# Patient Record
Sex: MALE | Race: WHITE | NOT HISPANIC OR LATINO | URBAN - METROPOLITAN AREA
[De-identification: names, ages, dates, MRNs, and addresses within clinical notes are randomized per-mention and may not be internally consistent; named-entity substitution may affect disease eponyms.]

---

## 2017-05-01 ENCOUNTER — OUTPATIENT (OUTPATIENT)
Dept: OUTPATIENT SERVICES | Facility: HOSPITAL | Age: 67
LOS: 1 days | End: 2017-05-01
Payer: MEDICAID

## 2017-05-03 DIAGNOSIS — R69 ILLNESS, UNSPECIFIED: ICD-10-CM

## 2017-06-01 PROCEDURE — G9001: CPT

## 2022-03-09 ENCOUNTER — INPATIENT (INPATIENT)
Facility: HOSPITAL | Age: 72
LOS: 6 days | Discharge: NURSING, REHAB OR LONG-TERM CARE | DRG: 300 | End: 2022-03-16
Attending: STUDENT IN AN ORGANIZED HEALTH CARE EDUCATION/TRAINING PROGRAM | Admitting: STUDENT IN AN ORGANIZED HEALTH CARE EDUCATION/TRAINING PROGRAM
Payer: MEDICARE

## 2022-03-09 VITALS
DIASTOLIC BLOOD PRESSURE: 87 MMHG | HEART RATE: 101 BPM | SYSTOLIC BLOOD PRESSURE: 144 MMHG | TEMPERATURE: 98 F | OXYGEN SATURATION: 95 % | RESPIRATION RATE: 19 BRPM

## 2022-03-09 DIAGNOSIS — I72.9 ANEURYSM OF UNSPECIFIED SITE: ICD-10-CM

## 2022-03-10 DIAGNOSIS — Z98.61 CORONARY ANGIOPLASTY STATUS: Chronic | ICD-10-CM

## 2022-03-10 DIAGNOSIS — Z98.890 OTHER SPECIFIED POSTPROCEDURAL STATES: Chronic | ICD-10-CM

## 2022-03-10 LAB
ALBUMIN SERPL ELPH-MCNC: 4 G/DL — SIGNIFICANT CHANGE UP (ref 3.3–5)
ALP SERPL-CCNC: 71 U/L — SIGNIFICANT CHANGE UP (ref 40–120)
ALT FLD-CCNC: 18 U/L — SIGNIFICANT CHANGE UP (ref 10–45)
ANION GAP SERPL CALC-SCNC: 14 MMOL/L — SIGNIFICANT CHANGE UP (ref 5–17)
APTT BLD: 29.4 SEC — SIGNIFICANT CHANGE UP (ref 27.5–35.5)
AST SERPL-CCNC: 15 U/L — SIGNIFICANT CHANGE UP (ref 10–40)
BILIRUB SERPL-MCNC: 0.3 MG/DL — SIGNIFICANT CHANGE UP (ref 0.2–1.2)
BLD GP AB SCN SERPL QL: NEGATIVE — SIGNIFICANT CHANGE UP
BUN SERPL-MCNC: 13 MG/DL — SIGNIFICANT CHANGE UP (ref 7–23)
CALCIUM SERPL-MCNC: 9.1 MG/DL — SIGNIFICANT CHANGE UP (ref 8.4–10.5)
CHLORIDE SERPL-SCNC: 98 MMOL/L — SIGNIFICANT CHANGE UP (ref 96–108)
CO2 SERPL-SCNC: 20 MMOL/L — LOW (ref 22–31)
CREAT SERPL-MCNC: 0.77 MG/DL — SIGNIFICANT CHANGE UP (ref 0.5–1.3)
EGFR: 96 ML/MIN/1.73M2 — SIGNIFICANT CHANGE UP
GLUCOSE SERPL-MCNC: 124 MG/DL — HIGH (ref 70–99)
HCT VFR BLD CALC: 39.1 % — SIGNIFICANT CHANGE UP (ref 39–50)
HCV AB S/CO SERPL IA: 0.09 S/CO — SIGNIFICANT CHANGE UP (ref 0–0.99)
HCV AB SERPL-IMP: SIGNIFICANT CHANGE UP
HGB BLD-MCNC: 13.3 G/DL — SIGNIFICANT CHANGE UP (ref 13–17)
INR BLD: 1.15 RATIO — SIGNIFICANT CHANGE UP (ref 0.88–1.16)
MAGNESIUM SERPL-MCNC: 1.9 MG/DL — SIGNIFICANT CHANGE UP (ref 1.6–2.6)
MCHC RBC-ENTMCNC: 31.1 PG — SIGNIFICANT CHANGE UP (ref 27–34)
MCHC RBC-ENTMCNC: 34 GM/DL — SIGNIFICANT CHANGE UP (ref 32–36)
MCV RBC AUTO: 91.4 FL — SIGNIFICANT CHANGE UP (ref 80–100)
NRBC # BLD: 0 /100 WBCS — SIGNIFICANT CHANGE UP (ref 0–0)
PHOSPHATE SERPL-MCNC: 3.8 MG/DL — SIGNIFICANT CHANGE UP (ref 2.5–4.5)
PLATELET # BLD AUTO: 163 K/UL — SIGNIFICANT CHANGE UP (ref 150–400)
POTASSIUM SERPL-MCNC: 3.6 MMOL/L — SIGNIFICANT CHANGE UP (ref 3.5–5.3)
POTASSIUM SERPL-SCNC: 3.6 MMOL/L — SIGNIFICANT CHANGE UP (ref 3.5–5.3)
PROT SERPL-MCNC: 6.6 G/DL — SIGNIFICANT CHANGE UP (ref 6–8.3)
PROTHROM AB SERPL-ACNC: 13.4 SEC — SIGNIFICANT CHANGE UP (ref 10.5–13.4)
RBC # BLD: 4.28 M/UL — SIGNIFICANT CHANGE UP (ref 4.2–5.8)
RBC # FLD: 13.9 % — SIGNIFICANT CHANGE UP (ref 10.3–14.5)
RH IG SCN BLD-IMP: NEGATIVE — SIGNIFICANT CHANGE UP
SARS-COV-2 RNA SPEC QL NAA+PROBE: SIGNIFICANT CHANGE UP
SODIUM SERPL-SCNC: 132 MMOL/L — LOW (ref 135–145)
WBC # BLD: 7.14 K/UL — SIGNIFICANT CHANGE UP (ref 3.8–10.5)
WBC # FLD AUTO: 7.14 K/UL — SIGNIFICANT CHANGE UP (ref 3.8–10.5)

## 2022-03-10 PROCEDURE — 99223 1ST HOSP IP/OBS HIGH 75: CPT

## 2022-03-10 PROCEDURE — 74174 CTA ABD&PLVS W/CONTRAST: CPT | Mod: 26

## 2022-03-10 PROCEDURE — 71275 CT ANGIOGRAPHY CHEST: CPT | Mod: 26

## 2022-03-10 RX ORDER — ASPIRIN/CALCIUM CARB/MAGNESIUM 324 MG
81 TABLET ORAL DAILY
Refills: 0 | Status: DISCONTINUED | OUTPATIENT
Start: 2022-03-10 | End: 2022-03-16

## 2022-03-10 RX ORDER — CLOPIDOGREL BISULFATE 75 MG/1
75 TABLET, FILM COATED ORAL DAILY
Refills: 0 | Status: DISCONTINUED | OUTPATIENT
Start: 2022-03-10 | End: 2022-03-16

## 2022-03-10 RX ORDER — AMLODIPINE BESYLATE 2.5 MG/1
10 TABLET ORAL DAILY
Refills: 0 | Status: DISCONTINUED | OUTPATIENT
Start: 2022-03-10 | End: 2022-03-16

## 2022-03-10 RX ORDER — CHLORPROMAZINE HCL 10 MG
1 TABLET ORAL
Qty: 0 | Refills: 0 | DISCHARGE

## 2022-03-10 RX ORDER — CHLORPROMAZINE HCL 10 MG
100 TABLET ORAL THREE TIMES A DAY
Refills: 0 | Status: DISCONTINUED | OUTPATIENT
Start: 2022-03-10 | End: 2022-03-16

## 2022-03-10 RX ORDER — AMLODIPINE BESYLATE 2.5 MG/1
1 TABLET ORAL
Qty: 0 | Refills: 0 | DISCHARGE

## 2022-03-10 RX ORDER — ASPIRIN/CALCIUM CARB/MAGNESIUM 324 MG
1 TABLET ORAL
Qty: 0 | Refills: 0 | DISCHARGE

## 2022-03-10 RX ORDER — SPIRONOLACTONE 25 MG/1
50 TABLET, FILM COATED ORAL DAILY
Refills: 0 | Status: DISCONTINUED | OUTPATIENT
Start: 2022-03-10 | End: 2022-03-16

## 2022-03-10 RX ORDER — ENOXAPARIN SODIUM 100 MG/ML
40 INJECTION SUBCUTANEOUS EVERY 24 HOURS
Refills: 0 | Status: DISCONTINUED | OUTPATIENT
Start: 2022-03-10 | End: 2022-03-16

## 2022-03-10 RX ORDER — POTASSIUM CHLORIDE 20 MEQ
10 PACKET (EA) ORAL
Refills: 0 | Status: COMPLETED | OUTPATIENT
Start: 2022-03-10 | End: 2022-03-10

## 2022-03-10 RX ORDER — SODIUM CHLORIDE 9 MG/ML
1000 INJECTION, SOLUTION INTRAVENOUS
Refills: 0 | Status: DISCONTINUED | OUTPATIENT
Start: 2022-03-10 | End: 2022-03-10

## 2022-03-10 RX ORDER — ATORVASTATIN CALCIUM 80 MG/1
1 TABLET, FILM COATED ORAL
Qty: 0 | Refills: 0 | DISCHARGE

## 2022-03-10 RX ORDER — CARVEDILOL PHOSPHATE 80 MG/1
25 CAPSULE, EXTENDED RELEASE ORAL EVERY 12 HOURS
Refills: 0 | Status: DISCONTINUED | OUTPATIENT
Start: 2022-03-10 | End: 2022-03-16

## 2022-03-10 RX ORDER — ATORVASTATIN CALCIUM 80 MG/1
40 TABLET, FILM COATED ORAL AT BEDTIME
Refills: 0 | Status: DISCONTINUED | OUTPATIENT
Start: 2022-03-10 | End: 2022-03-16

## 2022-03-10 RX ADMIN — Medication 81 MILLIGRAM(S): at 13:02

## 2022-03-10 RX ADMIN — Medication 100 MILLIGRAM(S): at 21:20

## 2022-03-10 RX ADMIN — ENOXAPARIN SODIUM 40 MILLIGRAM(S): 100 INJECTION SUBCUTANEOUS at 05:10

## 2022-03-10 RX ADMIN — AMLODIPINE BESYLATE 10 MILLIGRAM(S): 2.5 TABLET ORAL at 05:09

## 2022-03-10 RX ADMIN — Medication 100 MILLIGRAM(S): at 13:02

## 2022-03-10 RX ADMIN — ATORVASTATIN CALCIUM 40 MILLIGRAM(S): 80 TABLET, FILM COATED ORAL at 21:19

## 2022-03-10 RX ADMIN — Medication 100 MILLIEQUIVALENT(S): at 09:22

## 2022-03-10 RX ADMIN — CARVEDILOL PHOSPHATE 25 MILLIGRAM(S): 80 CAPSULE, EXTENDED RELEASE ORAL at 17:06

## 2022-03-10 RX ADMIN — Medication 100 MILLIEQUIVALENT(S): at 10:56

## 2022-03-10 RX ADMIN — SPIRONOLACTONE 50 MILLIGRAM(S): 25 TABLET, FILM COATED ORAL at 05:10

## 2022-03-10 RX ADMIN — CLOPIDOGREL BISULFATE 75 MILLIGRAM(S): 75 TABLET, FILM COATED ORAL at 13:02

## 2022-03-10 RX ADMIN — Medication 100 MILLIGRAM(S): at 05:09

## 2022-03-10 RX ADMIN — Medication 100 MILLIEQUIVALENT(S): at 13:02

## 2022-03-10 RX ADMIN — CARVEDILOL PHOSPHATE 25 MILLIGRAM(S): 80 CAPSULE, EXTENDED RELEASE ORAL at 05:10

## 2022-03-10 NOTE — H&P ADULT - ASSESSMENT
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     Plan:  - ASA and plavix   - on home meds  - NPO/IVF  - stat labs(CBC, CMP, PTT/PT/INR, type and screen)  - f/u vitals  - f/u I&O    Plan discussed with vascular fellow Dr. 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     Plan:  - ASA and plavix   - on home meds  - NPO/IVF  - stat labs(CBC, CMP, PTT/PT/INR, type and screen)  - f/u vitals  - f/u I&O    Plan discussed with vascular fellow Dr. 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     Plan:  - continue ASA and plavix given recent cardiac stent (RCA stent 2/2022)  - continue home meds  - NPO for now, IVF  - medical and cardiology optimization  - imaging CD from [facility] given to radiology dept to be uploaded  - plan dw fellow    Vascular  [id number]

## 2022-03-10 NOTE — PATIENT PROFILE ADULT - HAVE YOU EXPERIENCED VIOLENCE OR A TRAUMATIC EVENT?
Impression: Vitreous degeneration, right eye: H43.811. Plan: Discussed diagnosis in detail with patient. There is no evidence of retinal pathology. No treatment is required at this time. Will continue to observe condition and or symptoms. Discussed signs and symptoms of PVD/floaters. Discussed signs and symptoms of retinal detachment/tears. Patient instructed to call the office immediately if any symptoms noted. Patient instructed to call if condition gets worse. no

## 2022-03-10 NOTE — PATIENT PROFILE ADULT - FALL HARM RISK - HARM RISK INTERVENTIONS

## 2022-03-10 NOTE — PHYSICAL THERAPY INITIAL EVALUATION ADULT - PERTINENT HX OF CURRENT PROBLEM, REHAB EVAL
Pt is a 73yo male with PMH of CAD s/p stent 2/2022, HTN. Patient presented to ER at [facility] yesterday with SOB and elevated heart rate. CT was done at OSH  to r/o PE, which showed a 6.7cm suprarenal abdominal aorta aneurysm, no PE. Patient was then transferred to [facility] for further care.

## 2022-03-10 NOTE — H&P ADULT - ATTENDING COMMENTS
73 yo M who presents as transfer with asymptomatic Type IV thoracoabdominal aortic aneurysm  notified by [facility] ER that patient has 6cm suprarenal AAA with inability for their vascular surgeons to take care of patient  patient accepted as transfer for higher level of care  CTA reviewed - Type IV TAAA  pt active smoker with history of psychiatric disease on Thorazine  consult Dr. Candace Murrieta for cardiac evaluation and management  TAAA meets size criteria for repair  will plan for open TAAA repair pending risk stratification and optimization 71 yo M who presents as transfer with asymptomatic Type III thoracoabdominal aortic aneurysm  notified by [facility] ER that patient has 6cm suprarenal AAA with inability for their vascular surgeons to take care of patient  patient accepted as transfer for higher level of care  CTA reviewed - Type III TAAA  pt active smoker with history of psychiatric disease on Thorazine  consult Dr. Candace Murrieta for cardiac evaluation and management  TAAA meets size criteria for repair  will plan for open TAAA repair pending risk stratification and optimization

## 2022-03-10 NOTE — PHYSICAL THERAPY INITIAL EVALUATION ADULT - PLANNED THERAPY INTERVENTIONS, PT EVAL
GOAL: Pt will perform 12 stairs with or without U HR as needed within 2-4weeks./balance training/bed mobility training/gait training/transfer training

## 2022-03-10 NOTE — H&P ADULT - NSHPLABSRESULTS_GEN_ALL_CORE
LABS:                         13.3   7.14  )-----------( 163      ( 10 Mar 2022 00:24 )             39.1           PT/INR - ( 10 Mar 2022 00:24 )   PT: 13.4 sec;   INR: 1.15 ratio         PTT - ( 10 Mar 2022 00:24 )  PTT:29.4 sec          CAPILLARY BLOOD GLUCOSE      CT from OSH uploaded.

## 2022-03-10 NOTE — PHYSICAL THERAPY INITIAL EVALUATION ADULT - PRECAUTIONS/LIMITATIONS, REHAB EVAL
CT abdomen: Aneurysmal dilatation of the aorta superior to the aortic hiatus of the diaphragm up to 5.1 x 4.6 cm. Suprarenal aortic aneurysm with atheromatous plaque present with total luminal diameter of 6 cm  Severe atherosclerotic plaque. Aneurysmal dilatation of the right common iliac artery, right internal iliac artery, and left internal iliac artery as above. Occlusion of the distal right superficial femoral artery. Severe narrowing of the left superficial femoral artery.Ill-defined hypodense area in the medial spleen with mild perisplenic stranding suspicious for abscess. The differential also includes infarct and malignancy./cardiac precautions/fall precautions

## 2022-03-10 NOTE — H&P ADULT - HISTORY OF PRESENT ILLNESS
Mr. Graves is a 71yo male with PMH of CAD s/p stent 2/2022, HTN. Patient presented to ER at [facility] yesterday with SOB and elevated heart rate.  Chest CT was done at OSH  to r/o PE, which showed a 6.7cm suprarenal abdominal aorta aneurysm, no PE. Patient was then transferred to [facility] for further care overnight. Patient has no history of AAA and is not aware of this before. Patient reports he has epigastric abdominal pain for a few days, blunt and consistent.  Patient has normal appetite, normal BM and urination. Denies chest pain, headache,  n/v/d,  fever.  Mr. Graves is a 71yo male with PMH of CAD s/p stent 2/2022, HTN. Patient presented to ER at [facility] yesterday with SOB and elevated heart rate. CT was done at OSH  to r/o PE, which showed a 6.7cm suprarenal abdominal aorta aneurysm, no PE. Patient was then transferred to [facility] for further care overnight. Patient has no history of AAA and is not aware of this before. Patient reports he has epigastric abdominal pain for a few days, blunt and consistent.  Patient has normal appetite, normal BM and urination. Denies chest pain, headache,  n/v/d,  fever.  Mr. Graves is a 73yo male with PMH of CAD s/p stent 2/2022, HTN. Patient presented to ER at [facility] yesterday with SOB and elevated heart rate. CT was done at OSH  to r/o PE, which showed a 6.7cm suprarenal abdominal aorta aneurysm, no PE. Patient was then transferred to [facility] for further care overnight. Patient has no history of AAA and is not aware of this before.  Patient has normal appetite, normal BM and urination. Denies chest pain, headache,  n/v/d,  fever.

## 2022-03-10 NOTE — H&P ADULT - NSHPPHYSICALEXAM_GEN_ALL_CORE
General: NAD, Lying in bed comfortably  Neuro: A+Ox3  HEENT: NC/AT, EOMI  Neck: Soft, supple  Cardio: RRR, nml S1/S2  Resp: Good effort, CTA b/l  Thorax: No chest wall tenderness  Breast: No lesions/masses, no drainage  GI/Abd: Soft, NT/ND, pulsatile mass in epigastric area.    Vascular: All 4 extremities warm.  Skin: Intact, no breakdown  Lymphatic/Nodes: No palpable lymphadenopathy  Musculoskeletal: All 4 extremities moving spontaneously, no limitations General: NAD, Lying in bed comfortably  Neuro: A+Ox3  HEENT: NC/AT, EOMI  Neck: Soft, supple  Cardio: RRR, nml S1/S2  Resp: Good effort, CTA b/l  Thorax: No chest wall tenderness  Breast: No lesions/masses, no drainage  GI/Abd: Soft, NT/ND, pulsatile mass in epigastric area.    Vascular: All 4 extremities warm, palpable femoral pulses bilaterally, non palpable pop/pedal pulses  Skin: Intact, no breakdown  Lymphatic/Nodes: No palpable lymphadenopathy  Musculoskeletal: All 4 extremities moving spontaneously, no limitations

## 2022-03-11 LAB
ANION GAP SERPL CALC-SCNC: 14 MMOL/L — SIGNIFICANT CHANGE UP (ref 5–17)
BUN SERPL-MCNC: 13 MG/DL — SIGNIFICANT CHANGE UP (ref 7–23)
CALCIUM SERPL-MCNC: 9.1 MG/DL — SIGNIFICANT CHANGE UP (ref 8.4–10.5)
CHLORIDE SERPL-SCNC: 100 MMOL/L — SIGNIFICANT CHANGE UP (ref 96–108)
CO2 SERPL-SCNC: 21 MMOL/L — LOW (ref 22–31)
CREAT SERPL-MCNC: 0.8 MG/DL — SIGNIFICANT CHANGE UP (ref 0.5–1.3)
EGFR: 95 ML/MIN/1.73M2 — SIGNIFICANT CHANGE UP
GLUCOSE SERPL-MCNC: 145 MG/DL — HIGH (ref 70–99)
HCT VFR BLD CALC: 41.5 % — SIGNIFICANT CHANGE UP (ref 39–50)
HGB BLD-MCNC: 13.7 G/DL — SIGNIFICANT CHANGE UP (ref 13–17)
MAGNESIUM SERPL-MCNC: 2 MG/DL — SIGNIFICANT CHANGE UP (ref 1.6–2.6)
MCHC RBC-ENTMCNC: 31.1 PG — SIGNIFICANT CHANGE UP (ref 27–34)
MCHC RBC-ENTMCNC: 33 GM/DL — SIGNIFICANT CHANGE UP (ref 32–36)
MCV RBC AUTO: 94.1 FL — SIGNIFICANT CHANGE UP (ref 80–100)
NRBC # BLD: 0 /100 WBCS — SIGNIFICANT CHANGE UP (ref 0–0)
PHOSPHATE SERPL-MCNC: 3.7 MG/DL — SIGNIFICANT CHANGE UP (ref 2.5–4.5)
PLATELET # BLD AUTO: 191 K/UL — SIGNIFICANT CHANGE UP (ref 150–400)
POTASSIUM SERPL-MCNC: 4.1 MMOL/L — SIGNIFICANT CHANGE UP (ref 3.5–5.3)
POTASSIUM SERPL-SCNC: 4.1 MMOL/L — SIGNIFICANT CHANGE UP (ref 3.5–5.3)
RBC # BLD: 4.41 M/UL — SIGNIFICANT CHANGE UP (ref 4.2–5.8)
RBC # FLD: 14.1 % — SIGNIFICANT CHANGE UP (ref 10.3–14.5)
SODIUM SERPL-SCNC: 135 MMOL/L — SIGNIFICANT CHANGE UP (ref 135–145)
WBC # BLD: 6.73 K/UL — SIGNIFICANT CHANGE UP (ref 3.8–10.5)
WBC # FLD AUTO: 6.73 K/UL — SIGNIFICANT CHANGE UP (ref 3.8–10.5)

## 2022-03-11 PROCEDURE — 99406 BEHAV CHNG SMOKING 3-10 MIN: CPT | Mod: GC

## 2022-03-11 PROCEDURE — 93306 TTE W/DOPPLER COMPLETE: CPT | Mod: 26

## 2022-03-11 PROCEDURE — 99223 1ST HOSP IP/OBS HIGH 75: CPT | Mod: GC,25

## 2022-03-11 PROCEDURE — 99232 SBSQ HOSP IP/OBS MODERATE 35: CPT

## 2022-03-11 RX ADMIN — AMLODIPINE BESYLATE 10 MILLIGRAM(S): 2.5 TABLET ORAL at 05:09

## 2022-03-11 RX ADMIN — Medication 100 MILLIGRAM(S): at 13:03

## 2022-03-11 RX ADMIN — ATORVASTATIN CALCIUM 40 MILLIGRAM(S): 80 TABLET, FILM COATED ORAL at 21:15

## 2022-03-11 RX ADMIN — SPIRONOLACTONE 50 MILLIGRAM(S): 25 TABLET, FILM COATED ORAL at 05:09

## 2022-03-11 RX ADMIN — CLOPIDOGREL BISULFATE 75 MILLIGRAM(S): 75 TABLET, FILM COATED ORAL at 13:03

## 2022-03-11 RX ADMIN — CARVEDILOL PHOSPHATE 25 MILLIGRAM(S): 80 CAPSULE, EXTENDED RELEASE ORAL at 05:09

## 2022-03-11 RX ADMIN — CARVEDILOL PHOSPHATE 25 MILLIGRAM(S): 80 CAPSULE, EXTENDED RELEASE ORAL at 18:06

## 2022-03-11 RX ADMIN — Medication 81 MILLIGRAM(S): at 13:03

## 2022-03-11 RX ADMIN — Medication 100 MILLIGRAM(S): at 21:15

## 2022-03-11 RX ADMIN — Medication 100 MILLIGRAM(S): at 05:09

## 2022-03-11 RX ADMIN — ENOXAPARIN SODIUM 40 MILLIGRAM(S): 100 INJECTION SUBCUTANEOUS at 05:08

## 2022-03-11 NOTE — PROGRESS NOTE ADULT - ATTENDING COMMENTS
73 yo M w/ Type III TAAA  will plan for operative repair  appreciate cardiology recs  pulm consult for PFTs  medical optimization and risk stratification

## 2022-03-11 NOTE — PROGRESS NOTE ADULT - ASSESSMENT
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     Plan:  - will plan for open TAAA repair   - pending cardiology risk stratification and optimization.   - continue ASA and plavix given recent cardiac stent (RCA stent 2/2022)  - continue home meds  - regular diet    Vascular  [id number] 71yo M ho CAD s/p stent 2/2022 transferred from OSH for newly found asymptomatic Type III TAAA.     Plan:  - will plan for open TAAA repair   - pending cardiology risk stratification and optimization.   - Echo ordered  - Pulm consult for perioperative risk assessment  - continue ASA and plavix given recent cardiac stent (RCA stent 2/2022)  - continue home meds  - regular diet    Vascular  [id number]

## 2022-03-11 NOTE — CONSULT NOTE ADULT - TIME BILLING
review of records/results, pulmonary evaluation and assessment, and discussion with patient and medical providers

## 2022-03-11 NOTE — CONSULT NOTE ADULT - SUBJECTIVE AND OBJECTIVE BOX
Henry Lopez MD  Interventional Cardiology / Endovascular Specialist  [place] Office : [phone] [place] [address] [place] [address]  Tel:   [place] Office : [phone] [facility] [place] [address]  Tel: [phone]        HISTORY OF PRESENTING ILLNESS:  Mr. Graves is a 71yo male with PMH of CAD  s/p stent x 2 2/2022 @ [facility] , HTN. Patient presented to ER at [facility] yesterday with SOB and elevated heart rate. CT was done at OSH  to r/o PE, which showed a 6.7cm suprarenal abdominal aorta aneurysm, no PE. Patient was then transferred to [facility] for further care overnight. Patient has no history of AAA and is not aware of this before.  Patient has normal appetite, normal BM and urination. Denies chest pain, headache,  n/v/d,  fever.    PAST MEDICAL & SURGICAL HISTORY:  CAD (coronary atherosclerotic disease)    HTN (hypertension)    History of percutaneous coronary intervention        SOCIAL HISTORY: Substance Use (street drugs): ( x ) never used  (  ) other:    FAMILY HISTORY:      REVIEW OF SYSTEMS:  CONSTITUTIONAL: No fever, weight loss, or fatigue  EYES: No eye pain, visual disturbances, or discharge  ENMT:  No difficulty hearing, tinnitus, vertigo; No sinus or throat pain  BREASTS: No pain, masses, or nipple discharge  GASTROINTESTINAL: No abdominal or epigastric pain. No nausea, vomiting, or hematemesis; No diarrhea or constipation. No melena or hematochezia.  GENITOURINARY: No dysuria, frequency, hematuria, or incontinence  NEUROLOGICAL: No headaches, memory loss, loss of strength, numbness, or tremors  ENDOCRINE: No heat or cold intolerance; No hair loss  MUSCULOSKELETAL: No joint pain or swelling; No muscle, back, or extremity pain  PSYCHIATRIC: No depression, anxiety, mood swings, or difficulty sleeping  HEME/LYMPH: No easy bruising, or bleeding gums  All others negative    MEDICATIONS:  amLODIPine   Tablet 10 milliGRAM(s) Oral daily  aspirin enteric coated 81 milliGRAM(s) Oral daily  carvedilol 25 milliGRAM(s) Oral every 12 hours  clopidogrel Tablet 75 milliGRAM(s) Oral daily  enoxaparin Injectable 40 milliGRAM(s) SubCutaneous every 24 hours  spironolactone 50 milliGRAM(s) Oral daily        chlorproMAZINE    Tablet 100 milliGRAM(s) Oral three times a day      atorvastatin 40 milliGRAM(s) Oral at bedtime        FAMILY HISTORY:        Allergies    No Known Allergies    Intolerances    	      PHYSICAL EXAM:  T(C): 36.7 (03-11-22 @ 17:49), Max: 36.9 (03-10-22 @ 22:13)  HR: 92 (03-11-22 @ 17:49) (86 - 99)  BP: 125/80 (03-11-22 @ 17:49) (110/74 - 136/86)  RR: 18 (03-11-22 @ 17:49) (18 - 18)  SpO2: 95% (03-11-22 @ 17:49) (93% - 95%)  Wt(kg): --  I&O's Summary    10 Mar 2022 07:01  -  11 Mar 2022 07:00  --------------------------------------------------------  IN: 1350 mL / OUT: 2250 mL / NET: -900 mL    11 Mar 2022 07:01  -  11 Mar 2022 22:06  --------------------------------------------------------  IN: 460 mL / OUT: 850 mL / NET: -390 mL        GENERAL: NAD  EYES: EOMI, PERRLA, conjunctiva and sclera clear  ENMT: No tonsillar erythema, exudates, or enlargement   Cardiovascular: Normal S1 S2, No JVD, No murmurs, No edema  Respiratory: Lungs clear to auscultation	  Gastrointestinal:  Soft, Non-tender, + BS	  Extremities: No edema      LABS:	 	    CARDIAC MARKERS:                                  13.7   6.73  )-----------( 191      ( 11 Mar 2022 07:15 )             41.5     03-11    135  |  100  |  13  ----------------------------<  145<H>  4.1   |  21<L>  |  0.80    Ca    9.1      11 Mar 2022 07:15  Phos  3.7     03-11  Mg     2.0     03-11    TPro  6.6  /  Alb  4.0  /  TBili  0.3  /  DBili  x   /  AST  15  /  ALT  18  /  AlkPhos  71  03-10    proBNP:   Lipid Profile:   HgA1c:   TSH:     Consultant(s) Notes Reviewed:  [x ] YES  [ ] NO    Care Discussed with Consultants/Other Providers [ x] YES  [ ] NO    Imaging Personally Reviewed independently:  [x] YES  [ ] NO    All labs, radiologic studies, vitals, orders and medications list reviewed. Patient is seen and examined at bedside. Case discussed with medical team.    ASSESSMENT/PLAN:

## 2022-03-11 NOTE — CONSULT NOTE ADULT - ASSESSMENT
72M current heavy smoker with >50 py history, HTN, CAD s/p stent 2/2022 presented to [facility] 3/9 with dyspnea x1 day and CTA chest there to rule out PE there was reportedly negative for PE but showed a 6.7cm suprarenal abdominal aorta aneurysm. Patient was then transferred to [facility] for further management as is now being planned for AAA repair, told by vascular team that this would require thoracotomy. Pulmonology consulted for preoperative risk assessment given heavy smoking history and likely undiagnosed COPD. Pt is coughing at bedside but says he rarely coughs and denies dyspnea on exertion and orthopnea, though he is a poor historian. The dyspnea that led him to go to the hospital has now resolved, and does not feel short of breath ambulating in the hallway, saturating 95% on ambulation. Pt says he sees a primary care doctor but has never seen a pulmonologist for COPD and has never taken inhalers or nebulizers.    72M current heavy smoker with >50 py history, HTN, CAD s/p stent 2/2022 presented to [facility] 3/9 with dyspnea x1 day and CTA chest there to rule out PE there was reportedly negative for PE but showed a 6.7cm suprarenal abdominal aorta aneurysm. Patient was then transferred to [facility] for further management as is now being planned for AAA repair, told by vascular team that this would require thoracotomy. Pulmonology consulted for preoperative risk assessment given heavy smoking history and likely undiagnosed COPD. Pt is currently asymptomatic without wheezing on exam, mild hypoxemia to 94% on ambulation. CTA chest here for evaluation of AAA showed mild combined emphysema and fibrosis likely associated with smoking. Though we have no PFTs, he likely has COPD. Given his age, his extensive smoking history, and his mild hypoxemia, pt is at high risk of pulmonary complications for the planned AAA repair.     Problems:  COPD  Aortic Aneurysm    Recommendations  - Start standing duonebs q6h and symbicort 160/4.5 2 puff BID  - Can check bedside spirometry, but unable to obtain full PFT in hospital   - Pt is otherwsie clinically optimized from a pulmonary perspective and he not currently experiencing COPD exacerbation, but the benefits of surgery should be weighed against the risk of pulmonary complications including respiratory failure and prolonged intubation         72M current heavy smoker with >50 py history, HTN, CAD s/p stent 2/2022 presented to [facility] 3/9 with dyspnea x1 day and CTA chest there to rule out PE there was reportedly negative for PE but showed a 6.7cm suprarenal abdominal aorta aneurysm. Patient was then transferred to [facility] for further management as is now being planned for AAA repair, told by vascular team that this would require thoracotomy. Pulmonology consulted for preoperative risk assessment given heavy smoking history and likely undiagnosed COPD. Pt is currently asymptomatic without wheezing on exam, mild hypoxemia to 94% on ambulation. CTA chest here for evaluation of AAA showed mild combined emphysema and fibrosis likely associated with smoking. Though we have no PFTs, he likely has COPD. Given his age, his extensive smoking history, and his mild hypoxemia, pt is at high risk of pulmonary complications for the planned AAA repair.     Problems:  COPD  Aortic Aneurysm    Recommendations  - Start standing duonebs q6h and symbicort 160/4.5 2 puff BID  - Can check bedside spirometry, but unable to obtain full PFT in hospital   - Maintain O2 saturation > 88%  - Pt is otherwise clinically optimized from a pulmonary perspective and he not currently experiencing COPD exacerbation, but the benefits of surgery should be weighed against the risk of pulmonary complications including respiratory failure and prolonged intubation

## 2022-03-11 NOTE — CONSULT NOTE ADULT - SUBJECTIVE AND OBJECTIVE BOX
CHIEF COMPLAINT: Shortness of breath    HPI: 72M current heavy smoker with >50 py history, HTN, CAD s/p stent 2/2022 presented to [facility] 3/9 with dyspnea x1 day and CTA chest there to rule out PE there was reportedly negative for PE but showed a 6.7cm suprarenal abdominal aorta aneurysm. Patient was then transferred to [facility] for further management as is now being planned for AAA repair, told by vascular team that this would require thoracotomy. Pulmonology consulted for preoperative risk assessment given heavy smoking history and likely undiagnosed COPD. Pt is coughing at bedside but says he rarely coughs and denies dyspnea on exertion and orthopnea, though he is a poor historian. The dyspnea that led him to go to the hospital has now resolved, and does not feel short of breath ambulating in the hallway, saturating 95% on ambulation. Pt says he sees a primary care doctor but has never seen a pulmonologist for COPD and has never taken inhalers or nebulizers.       PAST MEDICAL & SURGICAL HISTORY:  CAD (coronary atherosclerotic disease)    HTN (hypertension)    History of percutaneous coronary intervention      FAMILY HISTORY: Non-contributoru      SOCIAL HISTORY:  Smoking: [ ] Never Smoked [ ] Former Smoker (__ packs x ___ years) [x] Current Smoker  (_1_ packs x _52__ years)  Substance Use: [ x ] Never Used [ ] Used ____  EtOH Use: none  Marital Status: [ ] Single [ ]  [ ]  [ ]   Sexual History:   Occupation:  Recent Travel:  Country of Birth:  Advance Directives:    Allergies    No Known Allergies    Intolerances        HOME MEDICATIONS:  Home Medications:  amLODIPine 10 mg oral tablet: 1 tab(s) orally once a day (10 Mar 2022 00:25)  aspirin 81 mg oral tablet: 1 tab(s) orally once a day (10 Mar 2022 00:25)  atorvastatin 40 mg oral tablet: 1 tab(s) orally once a day (10 Mar 2022 00:25)  carvedilol 25 mg oral tablet: 1 tab(s) orally 2 times a day (10 Mar 2022 00:25)  chlorproMAZINE 100 mg oral tablet: 1 tab(s) orally 3 times a day (10 Mar 2022 00:25)  clopidogrel 75 mg oral tablet: 1 tab(s) orally once a day (10 Mar 2022 00:25)  fludrocortisone 0.1 mg oral tablet: 1 tab(s) orally 2 times a day (10 Mar 2022 00:25)  spironolactone 50 mg oral tablet: 1 tab(s) orally once a day (10 Mar 2022 00:25)      REVIEW OF SYSTEMS:  Constitutional: [ ] negative [ ] fevers [ ] chills [ ] weight loss [ ] weight gain [ ] malaise  HEENT: [ ] negative [ ] visual disturbances [ ] postnasal drip [ ] nasal congestion [ ] sore throat  CV: [ ] negative [ ] chest pain [ ] palpitations [ ] orthopnea   Resp: [ ] negative [ ] cough [ ] shortness of breath [ ] wheezing [ ] sputum [ ] hemoptysis  GI: [ ] negative [ ] nausea [ ] vomiting [ ] abdominal pain [ ] dysphagia [ ] diarrhea [ ] melena [ ] hematochezia  : [ ] negative [ ] dysuria [ ] nocturia [ ] hematuria [ ] increased urinary frequency  Musculoskeletal: [ ] negative [ ] back pain [ ] myalgias [ ] arthralgias [ ] fracture  Skin: [ ] negative [ ] rash [ ] pruritus  Neurological: [ ] negative [ ] headache [ ] dizziness [ ] syncope [ ] weakness [ ] numbness  Psychiatric: [ ] negative [ ] anxiety [ ] depression  Endocrine: [ ] negative [ ] diabetes [ ] thyroid problem  Hematologic/Lymphatic: [ ] negative [ ] anemia [ ] bleeding problem  Allergic/Immunologic: [ ] hives [ ] angioedema  [ ] All other systems negative  [ ] Unable to assess ROS because ________    OBJECTIVE:  ICU Vital Signs Last 24 Hrs  T(C): 36.6 (11 Mar 2022 13:18), Max: 36.9 (10 Mar 2022 22:13)  T(F): 97.8 (11 Mar 2022 13:18), Max: 98.5 (10 Mar 2022 22:13)  HR: 98 (11 Mar 2022 13:18) (86 - 99)  BP: 110/74 (11 Mar 2022 13:18) (110/74 - 136/86)  BP(mean): --  ABP: --  ABP(mean): --  RR: 18 (11 Mar 2022 13:18) (18 - 18)  SpO2: 93% (11 Mar 2022 13:18) (93% - 95%)        03-10 @ 07:01 - 03-11 @ 07:00  --------------------------------------------------------  IN: 1350 mL / OUT: 2250 mL / NET: -900 mL    03-11 @ 07:01 - 03-11 @ 17:43  --------------------------------------------------------  IN: 460 mL / OUT: 650 mL / NET: -190 mL      CAPILLARY BLOOD GLUCOSE          PHYSICAL EXAM:  General: NAD, appears comfortable  Skin: Warm and dry, no rashes  Neuro: AAOx3, nonfocal  HEENT: PERRL, EOMI, no oral lesions  Neck: Full range of motion, no JVD  Lymph: No cervical, axillary, or supraclavicular lymphadenopathy  Lungs: Shallow breath sounds but CTA b/l without wheezing  Heart: Regular rate and rhythm, no murmurs  Abdomen: Normoactive bowl sounds, soft, nontender, nondistended  Extremities: No lower extremity tenderness, erythema, or edema       HOSPITAL MEDICATIONS:  Standing Meds:  amLODIPine   Tablet 10 milliGRAM(s) Oral daily  aspirin enteric coated 81 milliGRAM(s) Oral daily  atorvastatin 40 milliGRAM(s) Oral at bedtime  carvedilol 25 milliGRAM(s) Oral every 12 hours  chlorproMAZINE    Tablet 100 milliGRAM(s) Oral three times a day  clopidogrel Tablet 75 milliGRAM(s) Oral daily  enoxaparin Injectable 40 milliGRAM(s) SubCutaneous every 24 hours  spironolactone 50 milliGRAM(s) Oral daily      PRN Meds:      LABS:                        13.7   6.73  )-----------( 191      ( 11 Mar 2022 07:15 )             41.5     Hgb Trend: 13.7<--, 13.3<--  03-11    135  |  100  |  13  ----------------------------<  145<H>  4.1   |  21<L>  |  0.80    Ca    9.1      11 Mar 2022 07:15  Phos  3.7     03-11  Mg     2.0     03-11    TPro  6.6  /  Alb  4.0  /  TBili  0.3  /  DBili  x   /  AST  15  /  ALT  18  /  AlkPhos  71  03-10    Creatinine Trend: 0.80<--, 0.77<--  PT/INR - ( 10 Mar 2022 00:24 )   PT: 13.4 sec;   INR: 1.15 ratio         PTT - ( 10 Mar 2022 00:24 )  PTT:29.4 sec        RADIOLOGY:    < from: CT Angio Chest Aorta w/wo IV Cont (03.10.22 @ 06:52) >  ACC: [id number] EXAM:  CT ANGIO ABD PELV (W)AW IC                        ACC: [id number] EXAM:  CT ANGIO CHEST AORTA WAWI                          PROCEDURE DATE:  03/10/2022          INTERPRETATION:  CLINICAL HISTORY: abdominal aortic aneurysm incidentally   seen in outside institution, shortness of breath    COMPARISON: No priors available for comparison.    PROCEDURE:  CT Angiography of the Chest, Abdomen and Pelvis.  Gated precontrast imaging was performed through the heart followed by   gatedCT Angiography of the heart with subsequent non-gated arterial   phase imaging of the chest, abdomen and pelvis.  Sagittal and coronal reformats were performed as well as 3D (MIP)   reconstructions.    FINDINGS:  CHEST:  LUNGS AND LARGE AIRWAYS: Patent central airways. Diffuse peripheral   reticular opacities, representing fibrosis. Mild emphysematous changes   bilaterally.  PLEURA: Trace left pleural effusion.  VESSELS:    Aortic measurements as follows:  Sinuses of Valsalva: 3.5 x 3.5 x 3.3 cm  Sinotubular junction: 3.1 x 3.3 cm  Ascending aorta: 3.8 x 3.9 cm  Aortic arch: 2.7 x 2.7 cm  The descending aorta follows a tortuous contour prior to entering the   aortic hiatus of the diaphragm. Just superior to the aortic hiatus, there   is aneurysmal dilatation of the descending aorta to approximately 5.1 x   4.6 cm. The course of the abdominal aorta is further described below.   There is no intramural hematoma or dissection of the intrathoracic aorta.    HEART: Calcifications along the papillary muscle of the left ventricle,   likely related to prior myocardial infarction. Corner artery stents. No   pericardial effusion.  MEDIASTINUM AND SHAMIKA: No lymphadenopathy.  CHEST WALL AND LOWER NECK: Soft tissue nodule in the anterior chest wall   measuring 1.4 x 2.2 cm (series 11 image 238).    ABDOMEN AND PELVIS:  LIVER: Peripherally enhancing hypodense nodule measuring 1.8 x 1.6 cm and   the right hepatic lobe, likely a hemangioma. Multiple additional   scattered small hypodensities, too small to characterize which are likely   cysts.  BILE DUCTS: Normal caliber.  GALLBLADDER: Within normal limits.  SPLEEN: Well-defined multilobulated hypodense region in the medial spleen   with a small amount of perisplenic fat stranding measuring 3.3x 3.3 cm   (series 11 image 464).  PANCREAS: Pancreatic calcifications.  ADRENALS: Bilateral adrenal thickening.  KIDNEYS/URETERS: Multiple bilateral cysts, largest measuring 2.3 cm in   the right upper pole and 1.8 cm and the left upper pole.    BLADDER: Within normal limits.  REPRODUCTIVE ORGANS: Prostate within normal limits.    BOWEL: No bowel obstruction. Appendix is normal.  PERITONEUM: No ascites.  VESSELS: Suprarenal abdominal aortic aneurysm measuring up to 6.0 cm when   measured in the coronal plane. There is a large amount of undulating   ulcerating plaque with the largest amount just proximal to the   bifurcation of the iliac arteries. There is severe narrowing of the   proximal celiac artery with poststenotic dilatation. The superior   mesenteric artery, bilateral renal arteries, and inferior mesenteric   artery are patent. There is also aneurysmal dilatation of the right   common iliac artery up to 2.4 cm (series 10 image 239), the right   internal iliac artery to 1.4 cm (series 10 image 252), and the left   internal iliac artery up to 1.3 cm (series 10 image 256). There is severe   narrowing of the distal left superficial femoral artery. There is   occlusion of the right superficial femoral artery (series 11 image 1490).  RETROPERITONEUM/LYMPH NODES: No lymphadenopathy.  ABDOMINAL WALL: Sebaceous cyst overlying the left chest wall.  BONES: Grade 1 L3-on-L4 retrolisthesis and L4-on-L5 retrolisthesis.   Sclerotic focus in L3 vertebral body, likely a bone island. Generalized   degenerative changes of spine.    IMPRESSION:    Aneurysmal dilatation of the aorta superior to the aortic hiatus of the   diaphragm up to 5.1 x 4.6 cm. Suprarenal aortic aneurysm with   atheromatous plaque present with total luminal diameterof 6 cm  Severe   atherosclerotic plaque as described above.    Aneurysmal dilatation of the right common iliac artery, right internal   iliac artery, and left internal iliac artery as above.Occlusion of the   distal right superficial femoral artery.Severe narrowing of the left   superficial femoral artery.    Ill-defined hypodense area in the medial spleen with mild perisplenic   stranding suspicious for abscess. The differential also includes infarct   and malignancy.      --- End of Report ---          YUDY GAMBINO MD; Resident Radiologist  This document has been electronically signed.  FADI ESQUEDA MD; Attending Radiologist  This document has been electronically signed. Mar 10 2022 11:24AM    < end of copied text >       CHIEF COMPLAINT: Shortness of breath    HPI: 72M current heavy smoker with >50 py history, HTN, CAD s/p stent 2/2022 presented to [facility] 3/9 with dyspnea x1 day and CTA chest there to rule out PE there was reportedly negative for PE but showed a 6.7cm suprarenal abdominal aorta aneurysm. Patient was then transferred to [facility] for further management as is now being planned for AAA repair, told by vascular team that this would require thoracotomy. Pulmonology consulted for preoperative risk assessment given heavy smoking history and likely undiagnosed COPD. Pt is coughing at bedside but says he rarely coughs and denies dyspnea on exertion and orthopnea, though he is a poor historian. The dyspnea that led him to go to the hospital has now resolved, and does not feel short of breath ambulating in the hallway, saturating 95% on ambulation. Pt says he sees a primary care doctor but has never seen a pulmonologist for COPD and has never taken inhalers or nebulizers.       PAST MEDICAL & SURGICAL HISTORY:  CAD (coronary atherosclerotic disease)    HTN (hypertension)    History of percutaneous coronary intervention      FAMILY HISTORY: Non-contributoru      SOCIAL HISTORY:  Smoking: [ ] Never Smoked [ ] Former Smoker (__ packs x ___ years) [x] Current Smoker  (_1_ packs x _52__ years)  Substance Use: [ x ] Never Used [ ] Used ____  EtOH Use: none  Marital Status: [ ] Single [ ]  [ ]  [ ]   Sexual History:   Occupation:  Recent Travel:  Country of Birth:  Advance Directives:    Allergies    No Known Allergies    Intolerances        HOME MEDICATIONS:  Home Medications:  amLODIPine 10 mg oral tablet: 1 tab(s) orally once a day (10 Mar 2022 00:25)  aspirin 81 mg oral tablet: 1 tab(s) orally once a day (10 Mar 2022 00:25)  atorvastatin 40 mg oral tablet: 1 tab(s) orally once a day (10 Mar 2022 00:25)  carvedilol 25 mg oral tablet: 1 tab(s) orally 2 times a day (10 Mar 2022 00:25)  chlorproMAZINE 100 mg oral tablet: 1 tab(s) orally 3 times a day (10 Mar 2022 00:25)  clopidogrel 75 mg oral tablet: 1 tab(s) orally once a day (10 Mar 2022 00:25)  fludrocortisone 0.1 mg oral tablet: 1 tab(s) orally 2 times a day (10 Mar 2022 00:25)  spironolactone 50 mg oral tablet: 1 tab(s) orally once a day (10 Mar 2022 00:25)      REVIEW OF SYSTEMS:  Constitutional: [ ] negative [ ] fevers [ ] chills [ ] weight loss [ ] weight gain [ ] malaise  HEENT: [ ] negative [ ] visual disturbances [ ] postnasal drip [ ] nasal congestion [ ] sore throat  CV: [ ] negative [ ] chest pain [ ] palpitations [ ] orthopnea   Resp: [ ] negative [ ] cough [ ] shortness of breath [ ] wheezing [ ] sputum [ ] hemoptysis  GI: [ ] negative [ ] nausea [ ] vomiting [ ] abdominal pain [ ] dysphagia [ ] diarrhea [ ] melena [ ] hematochezia  : [ ] negative [ ] dysuria [ ] nocturia [ ] hematuria [ ] increased urinary frequency  Musculoskeletal: [ ] negative [ ] back pain [ ] myalgias [ ] arthralgias [ ] fracture  Skin: [ ] negative [ ] rash [ ] pruritus  Neurological: [ ] negative [ ] headache [ ] dizziness [ ] syncope [ ] weakness [ ] numbness  Psychiatric: [ ] negative [ ] anxiety [ ] depression  Endocrine: [ ] negative [ ] diabetes [ ] thyroid problem  Hematologic/Lymphatic: [ ] negative [ ] anemia [ ] bleeding problem  Allergic/Immunologic: [ ] hives [ ] angioedema  [ ] All other systems negative  [ ] Unable to assess ROS because ________    OBJECTIVE:  ICU Vital Signs Last 24 Hrs  T(C): 36.6 (11 Mar 2022 13:18), Max: 36.9 (10 Mar 2022 22:13)  T(F): 97.8 (11 Mar 2022 13:18), Max: 98.5 (10 Mar 2022 22:13)  HR: 98 (11 Mar 2022 13:18) (86 - 99)  BP: 110/74 (11 Mar 2022 13:18) (110/74 - 136/86)  BP(mean): --  ABP: --  ABP(mean): --  RR: 18 (11 Mar 2022 13:18) (18 - 18)  SpO2: 93% (11 Mar 2022 13:18) (93% - 95%)        03-10 @ 07:01 - 03-11 @ 07:00  --------------------------------------------------------  IN: 1350 mL / OUT: 2250 mL / NET: -900 mL    03-11 @ 07:01 - 03-11 @ 17:43  --------------------------------------------------------  IN: 460 mL / OUT: 650 mL / NET: -190 mL      CAPILLARY BLOOD GLUCOSE          PHYSICAL EXAM:  General: NAD, appears comfortable  Skin: Warm and dry, no rashes  Neuro: AAOx3, nonfocal  HEENT: PERRL, EOMI, no oral lesions  Neck: Full range of motion, no JVD  Lymph: No cervical, axillary, or supraclavicular lymphadenopathy  Lungs: Shallow breath sounds but CTA b/l without wheezing  Heart: Regular rate and rhythm, no murmurs  Abdomen: Normoactive bowl sounds, soft, nontender, nondistended  Extremities: No lower extremity tenderness, erythema, or edema       HOSPITAL MEDICATIONS:  Standing Meds:  amLODIPine   Tablet 10 milliGRAM(s) Oral daily  aspirin enteric coated 81 milliGRAM(s) Oral daily  atorvastatin 40 milliGRAM(s) Oral at bedtime  carvedilol 25 milliGRAM(s) Oral every 12 hours  chlorproMAZINE    Tablet 100 milliGRAM(s) Oral three times a day  clopidogrel Tablet 75 milliGRAM(s) Oral daily  enoxaparin Injectable 40 milliGRAM(s) SubCutaneous every 24 hours  spironolactone 50 milliGRAM(s) Oral daily      PRN Meds:      LABS:                        13.7   6.73  )-----------( 191      ( 11 Mar 2022 07:15 )             41.5     Hgb Trend: 13.7<--, 13.3<--  03-11    135  |  100  |  13  ----------------------------<  145<H>  4.1   |  21<L>  |  0.80    Ca    9.1      11 Mar 2022 07:15  Phos  3.7     03-11  Mg     2.0     03-11    TPro  6.6  /  Alb  4.0  /  TBili  0.3  /  DBili  x   /  AST  15  /  ALT  18  /  AlkPhos  71  03-10    Creatinine Trend: 0.80<--, 0.77<--  PT/INR - ( 10 Mar 2022 00:24 )   PT: 13.4 sec;   INR: 1.15 ratio         PTT - ( 10 Mar 2022 00:24 )  PTT:29.4 sec        RADIOLOGY:    < from: CT Angio Chest Aorta w/wo IV Cont (03.10.22 @ 06:52) >  ACC: [id number] EXAM:  CT ANGIO ABD PELV (W)AW IC                        ACC: [id number] EXAM:  CT ANGIO CHEST AORTA WAWI                          PROCEDURE DATE:  03/10/2022          INTERPRETATION:  CLINICAL HISTORY: abdominal aortic aneurysm incidentally   seen in outside institution, shortness of breath    COMPARISON: No priors available for comparison.    PROCEDURE:  CT Angiography of the Chest, Abdomen and Pelvis.  Gated precontrast imaging was performed through the heart followed by   gatedCT Angiography of the heart with subsequent non-gated arterial   phase imaging of the chest, abdomen and pelvis.  Sagittal and coronal reformats were performed as well as 3D (MIP)   reconstructions.    FINDINGS:  CHEST:  LUNGS AND LARGE AIRWAYS: Patent central airways. Diffuse peripheral   reticular opacities, representing fibrosis. Mild emphysematous changes   bilaterally.  PLEURA: Trace left pleural effusion.  VESSELS:    Aortic measurements as follows:  Sinuses of Valsalva: 3.5 x 3.5 x 3.3 cm  Sinotubular junction: 3.1 x 3.3 cm  Ascending aorta: 3.8 x 3.9 cm  Aortic arch: 2.7 x 2.7 cm  The descending aorta follows a tortuous contour prior to entering the   aortic hiatus of the diaphragm. Just superior to the aortic hiatus, there   is aneurysmal dilatation of the descending aorta to approximately 5.1 x   4.6 cm. The course of the abdominal aorta is further described below.   There is no intramural hematoma or dissection of the intrathoracic aorta.    HEART: Calcifications along the papillary muscle of the left ventricle,   likely related to prior myocardial infarction. Corner artery stents. No   pericardial effusion.  MEDIASTINUM AND SHAMIKA: No lymphadenopathy.  CHEST WALL AND LOWER NECK: Soft tissue nodule in the anterior chest wall   measuring 1.4 x 2.2 cm (series 11 image 238).    ABDOMEN AND PELVIS:  LIVER: Peripherally enhancing hypodense nodule measuring 1.8 x 1.6 cm and   the right hepatic lobe, likely a hemangioma. Multiple additional   scattered small hypodensities, too small to characterize which are likely   cysts.  BILE DUCTS: Normal caliber.  GALLBLADDER: Within normal limits.  SPLEEN: Well-defined multilobulated hypodense region in the medial spleen   with a small amount of perisplenic fat stranding measuring 3.3x 3.3 cm   (series 11 image 464).  PANCREAS: Pancreatic calcifications.  ADRENALS: Bilateral adrenal thickening.  KIDNEYS/URETERS: Multiple bilateral cysts, largest measuring 2.3 cm in   the right upper pole and 1.8 cm and the left upper pole.    BLADDER: Within normal limits.  REPRODUCTIVE ORGANS: Prostate within normal limits.    BOWEL: No bowel obstruction. Appendix is normal.  PERITONEUM: No ascites.  VESSELS: Suprarenal abdominal aortic aneurysm measuring up to 6.0 cm when   measured in the coronal plane. There is a large amount of undulating   ulcerating plaque with the largest amount just proximal to the   bifurcation of the iliac arteries. There is severe narrowing of the   proximal celiac artery with poststenotic dilatation. The superior   mesenteric artery, bilateral renal arteries, and inferior mesenteric   artery are patent. There is also aneurysmal dilatation of the right   common iliac artery up to 2.4 cm (series 10 image 239), the right   internal iliac artery to 1.4 cm (series 10 image 252), and the left   internal iliac artery up to 1.3 cm (series 10 image 256). There is severe   narrowing of the distal left superficial femoral artery. There is   occlusion of the right superficial femoral artery (series 11 image 1490).  RETROPERITONEUM/LYMPH NODES: No lymphadenopathy.  ABDOMINAL WALL: Sebaceous cyst overlying the left chest wall.  BONES: Grade 1 L3-on-L4 retrolisthesis and L4-on-L5 retrolisthesis.   Sclerotic focus in L3 vertebral body, likely a bone island. Generalized   degenerative changes of spine.    IMPRESSION:    Aneurysmal dilatation of the aorta superior to the aortic hiatus of the   diaphragm up to 5.1 x 4.6 cm. Suprarenal aortic aneurysm with   atheromatous plaque present with total luminal diameterof 6 cm  Severe   atherosclerotic plaque as described above.    Aneurysmal dilatation of the right common iliac artery, right internal   iliac artery, and left internal iliac artery as above.Occlusion of the   distal right superficial femoral artery.Severe narrowing of the left   superficial femoral artery.    Ill-defined hypodense area in the medial spleen with mild perisplenic   stranding suspicious for abscess. The differential also includes infarct   and malignancy.      --- End of Report ---        YUDY GAMBINO MD; Resident Radiologist  This document has been electronically signed.  FADI ESQUEDA MD; Attending Radiologist  This document has been electronically signed. Mar 10 2022 11:24AM    < end of copied text >

## 2022-03-11 NOTE — CONSULT NOTE ADULT - ATTENDING COMMENTS
Agree with above. Patient seen and examined. Patient with a very extensive smoking history who continues to smoke with a history of CAD (stent) now presenting for evaluation of suprarenal AAA. Patient is being evaluated for surgical intervention.    Patient does not have any outpatient pulmonary followup and has never had PFTs. He does not use any bronchodilator therapy and denies pulmonary symptoms. He has not had any recent pulmonary infections. He is saturating 94-95% on RA at rest. He is not in distress.     - There are no absolute pulmonary contraindications to proceeding with the planned surgery. Patient does not require any further pulmonary testing prior to procedure. Patient is at high risk for perioperative pulmonary complications based on the ARISCAT risk calculator - this is mediated by his smoking history and relative hypoxemia, age, and nature of surgery.   - Would start Symbicort BID and Duonebs Q6 ATC in the perioperative period. He does not require systemic steroids at this time.  - Patient should be given an incentive spirometer and acapella  - Maintain O2 sat > 90%  - CT imaging reviewed - and patient with evidence of emphysema and reticular/interstitial lung disease. 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   - Smoking cessation will be imperative - but patient states he is not ready or willing to quit at this time. Would suggest addition of nicotine patch. Smoking cessation 4 min. Would benefit from [facility] when ready/willing to consider smoking cessation.    Please do not hesitate to call the pulmonary team with questions. We can be reached at [phone] this weekend.    If patient is agreeable then outpatient pulmonary followup would be beneficial. He would need full PFTs and 6MWT to better delineate appropriate care/treatments over time. He does not feel that his lungs, or smoking, are a problem at this time.

## 2022-03-11 NOTE — PROGRESS NOTE ADULT - SUBJECTIVE AND OBJECTIVE BOX
Surgery Progress Note    INTERVAl/SUBJECTIVE: No acute event overnight. Patient seen and examined in am rounds.     Vital Signs Last 24 Hrs  T(C): 36.4 (11 Mar 2022 01:00), Max: 37.1 (10 Mar 2022 05:07)  T(F): 97.6 (11 Mar 2022 01:00), Max: 98.8 (10 Mar 2022 16:53)  HR: 99 (11 Mar 2022 01:00) (86 - 106)  BP: 136/86 (11 Mar 2022 01:00) (111/74 - 136/86)  BP(mean): --  RR: 18 (11 Mar 2022 01:00) (17 - 18)  SpO2: 95% (11 Mar 2022 01:00) (92% - 95%)    Physical Exam:  General: NAD, Lying in bed comfortably  Neuro: A+Ox3  HEENT: NC/AT, EOMI  Neck: Soft, supple  Cardio: RRR, nml S1/S2  Resp: Good effort, CTA b/l  Thorax: No chest wall tenderness  Breast: No lesions/masses, no drainage  GI/Abd: Soft, NT/ND, pulsatile mass in epigastric area.    Vascular: All 4 extremities warm, palpable femoral pulses bilaterally, non palpable pop/pedal pulses  Skin: Intact, no breakdown  Lymphatic/Nodes: No palpable lymphadenopathy  Musculoskeletal: All 4 extremities moving spontaneously, no limitations      LABS:                        13.3   7.14  )-----------( 163      ( 10 Mar 2022 00:24 )             39.1     03-10    132<L>  |  98  |  13  ----------------------------<  124<H>  3.6   |  20<L>  |  0.77    Ca    9.1      10 Mar 2022 00:24  Phos  3.8     03-10  Mg     1.9     03-10    TPro  6.6  /  Alb  4.0  /  TBili  0.3  /  DBili  x   /  AST  15  /  ALT  18  /  AlkPhos  71  03-10    PT/INR - ( 10 Mar 2022 00:24 )   PT: 13.4 sec;   INR: 1.15 ratio         PTT - ( 10 Mar 2022 00:24 )  PTT:29.4 sec      INs and OUTs:    03-09-22 @ 07:01  -  03-10-22 @ 07:00  --------------------------------------------------------  IN: 600 mL / OUT: 1100 mL / NET: -500 mL    03-10-22 @ 07:01  -  03-11-22 @ 01:53  --------------------------------------------------------  IN: 1230 mL / OUT: 1900 mL / NET: -670 mL     Surgery Progress Note    INTERVAl/SUBJECTIVE: No acute event overnight. Patient seen and examined in am rounds. No complaints this morning.     Vital Signs Last 24 Hrs  T(C): 36.4 (11 Mar 2022 05:03), Max: 37.1 (10 Mar 2022 16:53)  T(F): 97.6 (11 Mar 2022 05:03), Max: 98.8 (10 Mar 2022 16:53)  HR: 96 (11 Mar 2022 05:03) (86 - 101)  BP: 116/73 (11 Mar 2022 05:03) (111/74 - 136/86)  BP(mean): --  RR: 18 (11 Mar 2022 05:03) (17 - 18)  SpO2: 94% (11 Mar 2022 05:03) (93% - 95%)    I&O's Detail    10 Mar 2022 07:01  -  11 Mar 2022 07:00  --------------------------------------------------------  IN:    Lactated Ringers: 750 mL    Oral Fluid: 600 mL  Total IN: 1350 mL    OUT:    Voided (mL): 2250 mL  Total OUT: 2250 mL    Total NET: -900 mL      11 Mar 2022 07:01  -  11 Mar 2022 09:20  --------------------------------------------------------  IN:  Total IN: 0 mL    OUT:    Voided (mL): 250 mL  Total OUT: 250 mL    Total NET: -250 mL    Physical Exam:  General: NAD, Lying in bed comfortably  Neuro: A+Ox3  HEENT: NC/AT, EOMI  Neck: Soft, supple  Cardio: RRR, nml S1/S2  Resp: Good effort, CTA b/l  Thorax: No chest wall tenderness  Breast: No lesions/masses, no drainage  GI/Abd: Soft, NT/ND, pulsatile mass in epigastric area.    Vascular: All 4 extremities warm, palpable femoral pulses bilaterally, non palpable pop/pedal pulses  Skin: Intact, no breakdown  Lymphatic/Nodes: No palpable lymphadenopathy  Musculoskeletal: All 4 extremities moving spontaneously, no limitations      LABS:                                   13.7   6.73  )-----------( 191      ( 11 Mar 2022 07:15 )             41.5       03-11    135  |  100  |  13  ----------------------------<  145<H>  4.1   |  21<L>  |  0.80    Ca    9.1      11 Mar 2022 07:15  Phos  3.7     03-11  Mg     2.0     03-11    TPro  6.6  /  Alb  4.0  /  TBili  0.3  /  DBili  x   /  AST  15  /  ALT  18  /  AlkPhos  71  03-10

## 2022-03-11 NOTE — CONSULT NOTE ADULT - ASSESSMENT
Echo < from: TTE with Doppler (w/Cont) (03.11.22 @ 10:19) >  1. Mild-moderate mitral regurgitation.  2. Aortic valvenot well visualized. Grossly, grossly  calcified aortic valve with decreased opening. Peak  transaortic valve gradient equals 21 mm Hg, mean  transaortic valve gradient equals 14 mm Hg, estimated  aortic valve area equals 1.8 sqcm (by continuity equation),  aortic valve velocity time integral equals 35 cm,  consistent with mild aortic stenosis. Moderate, eccentric  aortic regurgitation.  3. Mild left ventricular enlargement.  4. Endocardial visualization enhanced with intravenous  injection of Ultrasonic Enhancing Agent (Definity).  Moderate segmental left ventricular systolic dysfunction.  The mid to basal inferolateral wall and inferoseptal wall  are severely hypokinetic.  5. Normal right ventricular size and function.    < end of copied text >    Assessment and plan     1) Pre op eval : recent PCI , Denies CP or SOB , echo with infero lateral and infseptal hypokinesis , moderate AI , recommend getting cath reports from [facility] , c/w asa plavix coreg     2) AAA: t/t per vascular     3) Splenic lesion : recommend further Eval conisder MRI     DVT PPX lovenox

## 2022-03-12 LAB
ANION GAP SERPL CALC-SCNC: 15 MMOL/L — SIGNIFICANT CHANGE UP (ref 5–17)
BUN SERPL-MCNC: 16 MG/DL — SIGNIFICANT CHANGE UP (ref 7–23)
CALCIUM SERPL-MCNC: 9.3 MG/DL — SIGNIFICANT CHANGE UP (ref 8.4–10.5)
CHLORIDE SERPL-SCNC: 98 MMOL/L — SIGNIFICANT CHANGE UP (ref 96–108)
CO2 SERPL-SCNC: 19 MMOL/L — LOW (ref 22–31)
CREAT SERPL-MCNC: 0.77 MG/DL — SIGNIFICANT CHANGE UP (ref 0.5–1.3)
EGFR: 96 ML/MIN/1.73M2 — SIGNIFICANT CHANGE UP
GLUCOSE SERPL-MCNC: 210 MG/DL — HIGH (ref 70–99)
HCT VFR BLD CALC: 39.5 % — SIGNIFICANT CHANGE UP (ref 39–50)
HGB BLD-MCNC: 13.3 G/DL — SIGNIFICANT CHANGE UP (ref 13–17)
MAGNESIUM SERPL-MCNC: 1.9 MG/DL — SIGNIFICANT CHANGE UP (ref 1.6–2.6)
MCHC RBC-ENTMCNC: 30.9 PG — SIGNIFICANT CHANGE UP (ref 27–34)
MCHC RBC-ENTMCNC: 33.7 GM/DL — SIGNIFICANT CHANGE UP (ref 32–36)
MCV RBC AUTO: 91.9 FL — SIGNIFICANT CHANGE UP (ref 80–100)
NRBC # BLD: 0 /100 WBCS — SIGNIFICANT CHANGE UP (ref 0–0)
PHOSPHATE SERPL-MCNC: 4.5 MG/DL — SIGNIFICANT CHANGE UP (ref 2.5–4.5)
PLATELET # BLD AUTO: 209 K/UL — SIGNIFICANT CHANGE UP (ref 150–400)
POTASSIUM SERPL-MCNC: 4.1 MMOL/L — SIGNIFICANT CHANGE UP (ref 3.5–5.3)
POTASSIUM SERPL-SCNC: 4.1 MMOL/L — SIGNIFICANT CHANGE UP (ref 3.5–5.3)
RBC # BLD: 4.3 M/UL — SIGNIFICANT CHANGE UP (ref 4.2–5.8)
RBC # FLD: 13.8 % — SIGNIFICANT CHANGE UP (ref 10.3–14.5)
SODIUM SERPL-SCNC: 132 MMOL/L — LOW (ref 135–145)
WBC # BLD: 5.47 K/UL — SIGNIFICANT CHANGE UP (ref 3.8–10.5)
WBC # FLD AUTO: 5.47 K/UL — SIGNIFICANT CHANGE UP (ref 3.8–10.5)

## 2022-03-12 RX ORDER — BUDESONIDE AND FORMOTEROL FUMARATE DIHYDRATE 160; 4.5 UG/1; UG/1
2 AEROSOL RESPIRATORY (INHALATION)
Refills: 0 | Status: DISCONTINUED | OUTPATIENT
Start: 2022-03-12 | End: 2022-03-16

## 2022-03-12 RX ORDER — IPRATROPIUM/ALBUTEROL SULFATE 18-103MCG
3 AEROSOL WITH ADAPTER (GRAM) INHALATION EVERY 6 HOURS
Refills: 0 | Status: DISCONTINUED | OUTPATIENT
Start: 2022-03-12 | End: 2022-03-16

## 2022-03-12 RX ADMIN — SPIRONOLACTONE 50 MILLIGRAM(S): 25 TABLET, FILM COATED ORAL at 06:07

## 2022-03-12 RX ADMIN — Medication 100 MILLIGRAM(S): at 14:13

## 2022-03-12 RX ADMIN — Medication 100 MILLIGRAM(S): at 06:07

## 2022-03-12 RX ADMIN — ATORVASTATIN CALCIUM 40 MILLIGRAM(S): 80 TABLET, FILM COATED ORAL at 22:02

## 2022-03-12 RX ADMIN — AMLODIPINE BESYLATE 10 MILLIGRAM(S): 2.5 TABLET ORAL at 06:06

## 2022-03-12 RX ADMIN — Medication 3 MILLILITER(S): at 11:28

## 2022-03-12 RX ADMIN — Medication 3 MILLILITER(S): at 08:02

## 2022-03-12 RX ADMIN — Medication 3 MILLILITER(S): at 17:24

## 2022-03-12 RX ADMIN — ENOXAPARIN SODIUM 40 MILLIGRAM(S): 100 INJECTION SUBCUTANEOUS at 06:06

## 2022-03-12 RX ADMIN — CLOPIDOGREL BISULFATE 75 MILLIGRAM(S): 75 TABLET, FILM COATED ORAL at 11:27

## 2022-03-12 RX ADMIN — Medication 81 MILLIGRAM(S): at 11:27

## 2022-03-12 RX ADMIN — CARVEDILOL PHOSPHATE 25 MILLIGRAM(S): 80 CAPSULE, EXTENDED RELEASE ORAL at 06:07

## 2022-03-12 RX ADMIN — BUDESONIDE AND FORMOTEROL FUMARATE DIHYDRATE 2 PUFF(S): 160; 4.5 AEROSOL RESPIRATORY (INHALATION) at 08:02

## 2022-03-12 RX ADMIN — Medication 100 MILLIGRAM(S): at 22:02

## 2022-03-12 RX ADMIN — CARVEDILOL PHOSPHATE 25 MILLIGRAM(S): 80 CAPSULE, EXTENDED RELEASE ORAL at 17:24

## 2022-03-12 RX ADMIN — BUDESONIDE AND FORMOTEROL FUMARATE DIHYDRATE 2 PUFF(S): 160; 4.5 AEROSOL RESPIRATORY (INHALATION) at 17:24

## 2022-03-12 NOTE — PROGRESS NOTE ADULT - ASSESSMENT
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     Plan:  - will plan for open TAAA repair   - pending cardiology risk stratification and optimization.   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     Plan:  - Duplex of lower extremities   - will plan for open TAAA repair   - pending cardiology risk stratification and optimization; will obtain records from [place]    - Echo ordered  - appreciate Pulm consult for perioperative risk assessment, documented  - continue ASA and plavix given recent cardiac stent (RCA stent 2/2022)  - continue home meds  - regular diet    Vascular  [id number]

## 2022-03-12 NOTE — PROGRESS NOTE ADULT - SUBJECTIVE AND OBJECTIVE BOX
Henry Lopez MD  Interventional Cardiology / Endovascular Specialist  [place] Office : [phone] [place] [address] [place]. [address]  Tel:   [place] Office : [address] [facility] [place] [address]  Tel: [phone]    Pt lying in Bed in NAD , denies CP and SOB   	  MEDICATIONS:  amLODIPine   Tablet 10 milliGRAM(s) Oral daily  aspirin enteric coated 81 milliGRAM(s) Oral daily  carvedilol 25 milliGRAM(s) Oral every 12 hours  clopidogrel Tablet 75 milliGRAM(s) Oral daily  enoxaparin Injectable 40 milliGRAM(s) SubCutaneous every 24 hours  spironolactone 50 milliGRAM(s) Oral daily      albuterol/ipratropium for Nebulization 3 milliLiter(s) Nebulizer every 6 hours  budesonide 160 MICROgram(s)/formoterol 4.5 MICROgram(s) Inhaler 2 Puff(s) Inhalation two times a day    chlorproMAZINE    Tablet 100 milliGRAM(s) Oral three times a day      atorvastatin 40 milliGRAM(s) Oral at bedtime        PAST MEDICAL/SURGICAL HISTORY  PAST MEDICAL & SURGICAL HISTORY:  CAD (coronary atherosclerotic disease)    HTN (hypertension)    History of percutaneous coronary intervention        SOCIAL HISTORY: Substance Use (street drugs): ( x ) never used  (  ) other:    FAMILY HISTORY:      REVIEW OF SYSTEMS:  CONSTITUTIONAL: No fever, weight loss, or fatigue  EYES: No eye pain, visual disturbances, or discharge  ENMT:  No difficulty hearing, tinnitus, vertigo; No sinus or throat pain  BREASTS: No pain, masses, or nipple discharge  GASTROINTESTINAL: No abdominal or epigastric pain. No nausea, vomiting, or hematemesis; No diarrhea or constipation. No melena or hematochezia.  GENITOURINARY: No dysuria, frequency, hematuria, or incontinence  NEUROLOGICAL: No headaches, memory loss, loss of strength, numbness, or tremors  ENDOCRINE: No heat or cold intolerance; No hair loss  MUSCULOSKELETAL: No joint pain or swelling; No muscle, back, or extremity pain  PSYCHIATRIC: No depression, anxiety, mood swings, or difficulty sleeping  HEME/LYMPH: No easy bruising, or bleeding gums  All others negative    PHYSICAL EXAM:  T(C): 36.8 (03-12-22 @ 09:01), Max: 36.8 (03-12-22 @ 09:01)  HR: 91 (03-12-22 @ 09:01) (89 - 98)  BP: 131/76 (03-12-22 @ 09:01) (110/74 - 135/79)  RR: 18 (03-12-22 @ 09:01) (18 - 18)  SpO2: 96% (03-12-22 @ 09:01) (93% - 97%)  Wt(kg): --  I&O's Summary    11 Mar 2022 07:01  -  12 Mar 2022 07:00  --------------------------------------------------------  IN: 1060 mL / OUT: 1250 mL / NET: -190 mL        GENERAL: NAD  EYES: EOMI, PERRLA, conjunctiva and sclera clear  ENMT: No tonsillar erythema, exudates, or enlargement   Cardiovascular: Normal S1 S2, No JVD, No murmurs, No edema  Respiratory: Lungs clear to auscultation	  Gastrointestinal:  Soft, Non-tender, + BS	  Extremities: No edema                          13.3   5.47  )-----------( 209      ( 12 Mar 2022 07:18 )             39.5     03-12    132<L>  |  98  |  16  ----------------------------<  210<H>  4.1   |  19<L>  |  0.77    Ca    9.3      12 Mar 2022 07:16  Phos  4.5     03-12  Mg     1.9     03-12      proBNP:   Lipid Profile:   HgA1c:   TSH:     Consultant(s) Notes Reviewed:  [x ] YES  [ ] NO    Care Discussed with Consultants/Other Providers [ x] YES  [ ] NO    Imaging Personally Reviewed independently:  [x] YES  [ ] NO    All labs, radiologic studies, vitals, orders and medications list reviewed. Patient is seen and examined at bedside. Case discussed with medical team.

## 2022-03-12 NOTE — PROGRESS NOTE ADULT - SUBJECTIVE AND OBJECTIVE BOX
Surgery Progress Note    INTERVAl/SUBJECTIVE: No acute event overnight. Patient seen and examined in am rounds.     Vital Signs Last 24 Hrs  T(C): 36.6 (11 Mar 2022 21:07), Max: 36.7 (11 Mar 2022 17:49)  T(F): 97.9 (11 Mar 2022 21:07), Max: 98 (11 Mar 2022 17:49)  HR: 89 (11 Mar 2022 21:07) (89 - 99)  BP: 131/78 (11 Mar 2022 21:07) (110/74 - 136/86)  BP(mean): --  RR: 18 (11 Mar 2022 21:07) (18 - 18)  SpO2: 96% (11 Mar 2022 21:07) (93% - 96%)    Physical Exam:  General: NAD, Lying in bed comfortably  Neuro: A+Ox3  HEENT: NC/AT, EOMI  Neck: Soft, supple  Cardio: RRR, nml S1/S2  Resp: Good effort, CTA b/l  Thorax: No chest wall tenderness  Breast: No lesions/masses, no drainage  GI/Abd: Soft, NT/ND, pulsatile mass in epigastric area.    Vascular: All 4 extremities warm, palpable femoral pulses bilaterally, non palpable pop/pedal pulses  Skin: Intact, no breakdown  Lymphatic/Nodes: No palpable lymphadenopathy  Musculoskeletal: All 4 extremities moving spontaneously, no limitations         LABS:                        13.7   6.73  )-----------( 191      ( 11 Mar 2022 07:15 )             41.5     03-11    135  |  100  |  13  ----------------------------<  145<H>  4.1   |  21<L>  |  0.80    Ca    9.1      11 Mar 2022 07:15  Phos  3.7     03-11  Mg     2.0     03-11            INs and OUTs:    03-10-22 @ 07:01  -  03-11-22 @ 07:00  --------------------------------------------------------  IN: 1350 mL / OUT: 2250 mL / NET: -900 mL    03-11-22 @ 07:01  -  03-12-22 @ 00:53  --------------------------------------------------------  IN: 1060 mL / OUT: 1250 mL / NET: -190 mL

## 2022-03-12 NOTE — PROGRESS NOTE ADULT - ASSESSMENT
Echo < from: TTE with Doppler (w/Cont) (03.11.22 @ 10:19) >  1. Mild-moderate mitral regurgitation.  2. Aortic valvenot well visualized. Grossly, grossly  calcified aortic valve with decreased opening. Peak  transaortic valve gradient equals 21 mm Hg, mean  transaortic valve gradient equals 14 mm Hg, estimated  aortic valve area equals 1.8 sqcm (by continuity equation),  aortic valve velocity time integral equals 35 cm,  consistent with mild aortic stenosis. Moderate, eccentric  aortic regurgitation.  3. Mild left ventricular enlargement.  4. Endocardial visualization enhanced with intravenous  injection of Ultrasonic Enhancing Agent (Definity).  Moderate segmental left ventricular systolic dysfunction.  The mid to basal inferolateral wall and inferoseptal wall  are severely hypokinetic.  5. Normal right ventricular size and function.    < end of copied text >    Assessment and plan     1) Pre op eval : recent PCI , Denies CP or SOB , echo with infero lateral and infseptal hypokinesis , moderate AI , recommend getting cath reports from [facility] , c/w asa plavix coreg     2) AAA: t/t per vascular     3) Splenic lesion : recommend further Eval conisder MRI     DVT PPX lovenox

## 2022-03-12 NOTE — CHART NOTE - NSCHARTNOTEFT_GEN_A_CORE
Called by the Vascular Surgery team regarding pulmonary optimization. They have indicated that patient is planned for a prolonged surgery with single lung ventilation.      There are no absolute pulmonary contraindications to proceeding with the planned procedure. Patient remains HIGH RISK for perioperative pulmonary complications. He does not require any further pulmonary testing or treatment.      - There are no absolute pulmonary contraindications to proceeding with the planned surgery. Patient does not require any further pulmonary testing prior to procedure. Patient is at high risk for perioperative pulmonary complications including, but not limited to prolonged respiratory failure, based on the ARISCAT risk calculator - this is mediated by his smoking history and relative hypoxemia, age, and nature of surgery.   - Would use Symbicort BID and Duonebs Q6 ATC in the perioperative period. He does not require systemic steroids at this time.  - Patient should be given an incentive spirometer and acapella  - Maintain O2 sat > 90%    Discussed with Vascular Surgery resident over the phone this afternoon.   Patient is awaiting cardiac risk stratification and then an overall risk/benefit discussion with the Vascular Surgery team regarding surgical decision. Please call [phone] to speak with the Pulmonary Consult team if any further questions or concerns.

## 2022-03-13 LAB
ANION GAP SERPL CALC-SCNC: 14 MMOL/L — SIGNIFICANT CHANGE UP (ref 5–17)
BUN SERPL-MCNC: 16 MG/DL — SIGNIFICANT CHANGE UP (ref 7–23)
CALCIUM SERPL-MCNC: 9.8 MG/DL — SIGNIFICANT CHANGE UP (ref 8.4–10.5)
CHLORIDE SERPL-SCNC: 97 MMOL/L — SIGNIFICANT CHANGE UP (ref 96–108)
CO2 SERPL-SCNC: 21 MMOL/L — LOW (ref 22–31)
CREAT SERPL-MCNC: 0.82 MG/DL — SIGNIFICANT CHANGE UP (ref 0.5–1.3)
EGFR: 94 ML/MIN/1.73M2 — SIGNIFICANT CHANGE UP
GLUCOSE SERPL-MCNC: 230 MG/DL — HIGH (ref 70–99)
HCT VFR BLD CALC: 42.8 % — SIGNIFICANT CHANGE UP (ref 39–50)
HGB BLD-MCNC: 14.3 G/DL — SIGNIFICANT CHANGE UP (ref 13–17)
MAGNESIUM SERPL-MCNC: 1.9 MG/DL — SIGNIFICANT CHANGE UP (ref 1.6–2.6)
MCHC RBC-ENTMCNC: 31 PG — SIGNIFICANT CHANGE UP (ref 27–34)
MCHC RBC-ENTMCNC: 33.4 GM/DL — SIGNIFICANT CHANGE UP (ref 32–36)
MCV RBC AUTO: 92.6 FL — SIGNIFICANT CHANGE UP (ref 80–100)
NRBC # BLD: 0 /100 WBCS — SIGNIFICANT CHANGE UP (ref 0–0)
PHOSPHATE SERPL-MCNC: 4.2 MG/DL — SIGNIFICANT CHANGE UP (ref 2.5–4.5)
PLATELET # BLD AUTO: 225 K/UL — SIGNIFICANT CHANGE UP (ref 150–400)
POTASSIUM SERPL-MCNC: 4.3 MMOL/L — SIGNIFICANT CHANGE UP (ref 3.5–5.3)
POTASSIUM SERPL-SCNC: 4.3 MMOL/L — SIGNIFICANT CHANGE UP (ref 3.5–5.3)
RBC # BLD: 4.62 M/UL — SIGNIFICANT CHANGE UP (ref 4.2–5.8)
RBC # FLD: 13.6 % — SIGNIFICANT CHANGE UP (ref 10.3–14.5)
SODIUM SERPL-SCNC: 132 MMOL/L — LOW (ref 135–145)
WBC # BLD: 5.88 K/UL — SIGNIFICANT CHANGE UP (ref 3.8–10.5)
WBC # FLD AUTO: 5.88 K/UL — SIGNIFICANT CHANGE UP (ref 3.8–10.5)

## 2022-03-13 PROCEDURE — 93925 LOWER EXTREMITY STUDY: CPT | Mod: 26

## 2022-03-13 RX ORDER — MAGNESIUM SULFATE 500 MG/ML
2 VIAL (ML) INJECTION ONCE
Refills: 0 | Status: COMPLETED | OUTPATIENT
Start: 2022-03-13 | End: 2022-03-13

## 2022-03-13 RX ADMIN — CARVEDILOL PHOSPHATE 25 MILLIGRAM(S): 80 CAPSULE, EXTENDED RELEASE ORAL at 17:01

## 2022-03-13 RX ADMIN — AMLODIPINE BESYLATE 10 MILLIGRAM(S): 2.5 TABLET ORAL at 05:13

## 2022-03-13 RX ADMIN — Medication 100 MILLIGRAM(S): at 21:02

## 2022-03-13 RX ADMIN — Medication 3 MILLILITER(S): at 05:14

## 2022-03-13 RX ADMIN — Medication 25 GRAM(S): at 08:51

## 2022-03-13 RX ADMIN — Medication 3 MILLILITER(S): at 11:22

## 2022-03-13 RX ADMIN — Medication 3 MILLILITER(S): at 23:10

## 2022-03-13 RX ADMIN — Medication 100 MILLIGRAM(S): at 13:31

## 2022-03-13 RX ADMIN — CARVEDILOL PHOSPHATE 25 MILLIGRAM(S): 80 CAPSULE, EXTENDED RELEASE ORAL at 05:13

## 2022-03-13 RX ADMIN — Medication 3 MILLILITER(S): at 17:01

## 2022-03-13 RX ADMIN — CLOPIDOGREL BISULFATE 75 MILLIGRAM(S): 75 TABLET, FILM COATED ORAL at 11:22

## 2022-03-13 RX ADMIN — SPIRONOLACTONE 50 MILLIGRAM(S): 25 TABLET, FILM COATED ORAL at 05:13

## 2022-03-13 RX ADMIN — BUDESONIDE AND FORMOTEROL FUMARATE DIHYDRATE 2 PUFF(S): 160; 4.5 AEROSOL RESPIRATORY (INHALATION) at 05:14

## 2022-03-13 RX ADMIN — ATORVASTATIN CALCIUM 40 MILLIGRAM(S): 80 TABLET, FILM COATED ORAL at 21:02

## 2022-03-13 RX ADMIN — Medication 81 MILLIGRAM(S): at 11:22

## 2022-03-13 RX ADMIN — ENOXAPARIN SODIUM 40 MILLIGRAM(S): 100 INJECTION SUBCUTANEOUS at 05:13

## 2022-03-13 RX ADMIN — BUDESONIDE AND FORMOTEROL FUMARATE DIHYDRATE 2 PUFF(S): 160; 4.5 AEROSOL RESPIRATORY (INHALATION) at 17:01

## 2022-03-13 RX ADMIN — Medication 100 MILLIGRAM(S): at 05:13

## 2022-03-13 NOTE — PROGRESS NOTE ADULT - ASSESSMENT
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     Plan:  - Duplex of lower extremities   - will plan for open TAAA repair   - pending cardiology risk stratification and optimization; echo obtained, will obtain records from [place]    - appreciate Pulm consult for perioperative risk assessment, documented  - continue ASA and plavix given recent cardiac stent (RCA stent 2/2022)  - continue home meds  - regular diet    Vascular  0800

## 2022-03-13 NOTE — PROGRESS NOTE ADULT - ASSESSMENT
Echo < from: TTE with Doppler (w/Cont) (03.11.22 @ 10:19) >  1. Mild-moderate mitral regurgitation.  2. Aortic valvenot well visualized. Grossly, grossly  calcified aortic valve with decreased opening. Peak  transaortic valve gradient equals 21 mm Hg, mean  transaortic valve gradient equals 14 mm Hg, estimated  aortic valve area equals 1.8 sqcm (by continuity equation),  aortic valve velocity time integral equals 35 cm,  consistent with mild aortic stenosis. Moderate, eccentric  aortic regurgitation.  3. Mild left ventricular enlargement.  4. Endocardial visualization enhanced with intravenous  injection of Ultrasonic Enhancing Agent (Definity).  Moderate segmental left ventricular systolic dysfunction.  The mid to basal inferolateral wall and inferoseptal wall  are severely hypokinetic.  5. Normal right ventricular size and function.    < end of copied text >    Assessment and plan     1) Pre op eval : recent PCI , Denies CP or SOB , echo with infero lateral and infseptal hypokinesis , moderate AI , recommend getting cath reports from [facility] , c/w asa plavix coreg     2) AAA: t/t per vascular     3) Splenic lesion : work up per primary team    DVT PPX lovenox

## 2022-03-13 NOTE — PROGRESS NOTE ADULT - SUBJECTIVE AND OBJECTIVE BOX
Henry Lopze MD  Interventional Cardiology / Endovascular Specialist  [place] Office : [address] [place] [address] [place] [address]  Tel:   [place] Office : [address] [facility] [place] [address]  Tel: [phone]    Pt lying in Bed in NAD , denies CP and SOB   	  MEDICATIONS:  amLODIPine   Tablet 10 milliGRAM(s) Oral daily  aspirin enteric coated 81 milliGRAM(s) Oral daily  carvedilol 25 milliGRAM(s) Oral every 12 hours  clopidogrel Tablet 75 milliGRAM(s) Oral daily  enoxaparin Injectable 40 milliGRAM(s) SubCutaneous every 24 hours  spironolactone 50 milliGRAM(s) Oral daily      albuterol/ipratropium for Nebulization 3 milliLiter(s) Nebulizer every 6 hours  budesonide 160 MICROgram(s)/formoterol 4.5 MICROgram(s) Inhaler 2 Puff(s) Inhalation two times a day    chlorproMAZINE    Tablet 100 milliGRAM(s) Oral three times a day      atorvastatin 40 milliGRAM(s) Oral at bedtime        PAST MEDICAL/SURGICAL HISTORY  PAST MEDICAL & SURGICAL HISTORY:  CAD (coronary atherosclerotic disease)    HTN (hypertension)    History of percutaneous coronary intervention        SOCIAL HISTORY: Substance Use (street drugs): ( x ) never used  (  ) other:    FAMILY HISTORY:      REVIEW OF SYSTEMS:  CONSTITUTIONAL: No fever, weight loss, or fatigue  EYES: No eye pain, visual disturbances, or discharge  ENMT:  No difficulty hearing, tinnitus, vertigo; No sinus or throat pain  BREASTS: No pain, masses, or nipple discharge  GASTROINTESTINAL: No abdominal or epigastric pain. No nausea, vomiting, or hematemesis; No diarrhea or constipation. No melena or hematochezia.  GENITOURINARY: No dysuria, frequency, hematuria, or incontinence  NEUROLOGICAL: No headaches, memory loss, loss of strength, numbness, or tremors  ENDOCRINE: No heat or cold intolerance; No hair loss  MUSCULOSKELETAL: No joint pain or swelling; No muscle, back, or extremity pain  PSYCHIATRIC: No depression, anxiety, mood swings, or difficulty sleeping  HEME/LYMPH: No easy bruising, or bleeding gums  All others negative    PHYSICAL EXAM:  T(C): 36.7 (03-13-22 @ 13:05), Max: 36.8 (03-12-22 @ 22:04)  HR: 85 (03-13-22 @ 13:05) (75 - 98)  BP: 119/71 (03-13-22 @ 13:05) (101/65 - 156/81)  RR: 18 (03-13-22 @ 13:05) (17 - 18)  SpO2: 95% (03-13-22 @ 13:05) (93% - 96%)  Wt(kg): --  I&O's Summary    12 Mar 2022 06:01  -  13 Mar 2022 07:00  --------------------------------------------------------  IN: 1500 mL / OUT: 0 mL / NET: 1500 mL    13 Mar 2022 07:01  -  13 Mar 2022 16:06  --------------------------------------------------------  IN: 600 mL / OUT: 0 mL / NET: 600 mL      GENERAL: NAD  EYES: EOMI, PERRLA, conjunctiva and sclera clear  ENMT: No tonsillar erythema, exudates, or enlargement   Cardiovascular: Normal S1 S2, No JVD, No murmurs, No edema  Respiratory: Lungs clear to auscultation	  Gastrointestinal:  Soft, Non-tender, + BS	  Extremities: No edema                            14.3   5.88  )-----------( 225      ( 13 Mar 2022 06:19 )             42.8     03-13    132<L>  |  97  |  16  ----------------------------<  230<H>  4.3   |  21<L>  |  0.82    Ca    9.8      13 Mar 2022 06:19  Phos  4.2     03-13  Mg     1.9     03-13      proBNP:   Lipid Profile:   HgA1c:   TSH:     Consultant(s) Notes Reviewed:  [x ] YES  [ ] NO    Care Discussed with Consultants/Other Providers [ x] YES  [ ] NO    Imaging Personally Reviewed independently:  [x] YES  [ ] NO    All labs, radiologic studies, vitals, orders and medications list reviewed. Patient is seen and examined at bedside. Case discussed with medical team.

## 2022-03-13 NOTE — PROGRESS NOTE ADULT - SUBJECTIVE AND OBJECTIVE BOX
Surgery Progress Note     Subjective/24hour Events: Patient seen and examined at the bedside this morning. No acute events overnight. Pain controlled.     Vital Signs:  Vital Signs Last 24 Hrs  T(C): 36.3 (13 Mar 2022 05:01), Max: 36.8 (12 Mar 2022 09:01)  T(F): 97.3 (13 Mar 2022 05:01), Max: 98.3 (12 Mar 2022 09:01)  HR: 87 (13 Mar 2022 05:01) (75 - 98)  BP: 106/69 (13 Mar 2022 05:01) (101/65 - 156/81)  BP(mean): --  RR: 18 (13 Mar 2022 05:01) (16 - 18)  SpO2: 94% (13 Mar 2022 05:01) (93% - 96%)        I&O's Detail    11 Mar 2022 07:01  -  12 Mar 2022 07:00  --------------------------------------------------------  IN:    Oral Fluid: 1060 mL  Total IN: 1060 mL    OUT:    Voided (mL): 1250 mL  Total OUT: 1250 mL    Total NET: -190 mL      12 Mar 2022 06:01  -  13 Mar 2022 06:05  --------------------------------------------------------  IN:    Oral Fluid: 660 mL  Total IN: 660 mL    OUT:  Total OUT: 0 mL    Total NET: 660 mL            Physical Exam:  General: NAD, Lying in bed comfortably  Neuro: A+Ox3  HEENT: NC/AT, EOMI  Neck: Soft, supple  Cardio: RRR, nml S1/S2  Resp: Good effort, CTA b/l  Thorax: No chest wall tenderness  Breast: No lesions/masses, no drainage  GI/Abd: Soft, NT/ND, pulsatile mass in epigastric area.    Vascular: All 4 extremities warm, palpable femoral pulses bilaterally, non palpable pop/pedal pulses  Skin: Intact, no breakdown  Lymphatic/Nodes: No palpable lymphadenopathy  Musculoskeletal: All 4 extremities moving spontaneously, no limitations      Labs:    03-12    132<L>  |  98  |  16  ----------------------------<  210<H>  4.1   |  19<L>  |  0.77    Ca    9.3      12 Mar 2022 07:16  Phos  4.5     03-12  Mg     1.9     03-12      CAPILLARY BLOOD GLUCOSE                                13.3   5.47  )-----------( 209      ( 12 Mar 2022 07:18 )             39.5            Surgery Progress Note     Subjective/24hour Events: Patient seen and examined at the bedside this morning. No acute events overnight. Continues to have mild abdominal pain unchanged from previous, tolerating diet, ambulating.     OBJECTIVE:  Vital Signs Last 24 Hrs  T(C): 36.4 (13 Mar 2022 09:07), Max: 36.8 (12 Mar 2022 13:46)  T(F): 97.5 (13 Mar 2022 09:07), Max: 98.3 (12 Mar 2022 13:46)  HR: 85 (13 Mar 2022 09:07) (75 - 98)  BP: 129/84 (13 Mar 2022 09:07) (101/65 - 156/81)  BP(mean): --  RR: 16 (13 Mar 2022 09:07) (16 - 18)  SpO2: 94% (13 Mar 2022 09:07) (93% - 96%)    I&O's Detail    12 Mar 2022 06:01  -  13 Mar 2022 07:00  --------------------------------------------------------  IN:    Oral Fluid: 1500 mL  Total IN: 1500 mL    OUT:  Total OUT: 0 mL    Total NET: 1500 mL    Physical Exam:  General: NAD, Lying in bed comfortably  Neuro: A+Ox3  HEENT: NC/AT, EOMI  Neck: Soft, supple  Cardio: RRR, nml S1/S2  Resp: Good effort, CTA b/l  Thorax: No chest wall tenderness  Breast: No lesions/masses, no drainage  GI/Abd: Soft, NT/ND, pulsatile mass in epigastric area.    Vascular: All 4 extremities warm, palpable femoral pulses bilaterally, non palpable pop/pedal pulses  Skin: Intact, no breakdown  Lymphatic/Nodes: No palpable lymphadenopathy  Musculoskeletal: All 4 extremities moving spontaneously, no limitations      Labs:                          14.3   5.88  )-----------( 225      ( 13 Mar 2022 06:19 )             42.8   03-13    132<L>  |  97  |  16  ----------------------------<  230<H>  4.3   |  21<L>  |  0.82    Ca    9.8      13 Mar 2022 06:19  Phos  4.2     03-13  Mg     1.9     03-13

## 2022-03-14 LAB
ANION GAP SERPL CALC-SCNC: 15 MMOL/L — SIGNIFICANT CHANGE UP (ref 5–17)
BUN SERPL-MCNC: 19 MG/DL — SIGNIFICANT CHANGE UP (ref 7–23)
CALCIUM SERPL-MCNC: 9.6 MG/DL — SIGNIFICANT CHANGE UP (ref 8.4–10.5)
CHLORIDE SERPL-SCNC: 95 MMOL/L — LOW (ref 96–108)
CO2 SERPL-SCNC: 20 MMOL/L — LOW (ref 22–31)
CREAT SERPL-MCNC: 0.89 MG/DL — SIGNIFICANT CHANGE UP (ref 0.5–1.3)
EGFR: 92 ML/MIN/1.73M2 — SIGNIFICANT CHANGE UP
GLUCOSE SERPL-MCNC: 227 MG/DL — HIGH (ref 70–99)
HCT VFR BLD CALC: 41.8 % — SIGNIFICANT CHANGE UP (ref 39–50)
HGB BLD-MCNC: 14 G/DL — SIGNIFICANT CHANGE UP (ref 13–17)
MAGNESIUM SERPL-MCNC: 2 MG/DL — SIGNIFICANT CHANGE UP (ref 1.6–2.6)
MCHC RBC-ENTMCNC: 30.8 PG — SIGNIFICANT CHANGE UP (ref 27–34)
MCHC RBC-ENTMCNC: 33.5 GM/DL — SIGNIFICANT CHANGE UP (ref 32–36)
MCV RBC AUTO: 91.9 FL — SIGNIFICANT CHANGE UP (ref 80–100)
NRBC # BLD: 0 /100 WBCS — SIGNIFICANT CHANGE UP (ref 0–0)
PHOSPHATE SERPL-MCNC: 4.2 MG/DL — SIGNIFICANT CHANGE UP (ref 2.5–4.5)
PLATELET # BLD AUTO: 248 K/UL — SIGNIFICANT CHANGE UP (ref 150–400)
POTASSIUM SERPL-MCNC: 4.2 MMOL/L — SIGNIFICANT CHANGE UP (ref 3.5–5.3)
POTASSIUM SERPL-SCNC: 4.2 MMOL/L — SIGNIFICANT CHANGE UP (ref 3.5–5.3)
RBC # BLD: 4.55 M/UL — SIGNIFICANT CHANGE UP (ref 4.2–5.8)
RBC # FLD: 13.5 % — SIGNIFICANT CHANGE UP (ref 10.3–14.5)
SODIUM SERPL-SCNC: 130 MMOL/L — LOW (ref 135–145)
WBC # BLD: 7.35 K/UL — SIGNIFICANT CHANGE UP (ref 3.8–10.5)
WBC # FLD AUTO: 7.35 K/UL — SIGNIFICANT CHANGE UP (ref 3.8–10.5)

## 2022-03-14 PROCEDURE — 93010 ELECTROCARDIOGRAM REPORT: CPT

## 2022-03-14 PROCEDURE — 99232 SBSQ HOSP IP/OBS MODERATE 35: CPT

## 2022-03-14 RX ADMIN — BUDESONIDE AND FORMOTEROL FUMARATE DIHYDRATE 2 PUFF(S): 160; 4.5 AEROSOL RESPIRATORY (INHALATION) at 05:16

## 2022-03-14 RX ADMIN — Medication 3 MILLILITER(S): at 05:14

## 2022-03-14 RX ADMIN — CARVEDILOL PHOSPHATE 25 MILLIGRAM(S): 80 CAPSULE, EXTENDED RELEASE ORAL at 17:52

## 2022-03-14 RX ADMIN — BUDESONIDE AND FORMOTEROL FUMARATE DIHYDRATE 2 PUFF(S): 160; 4.5 AEROSOL RESPIRATORY (INHALATION) at 17:52

## 2022-03-14 RX ADMIN — Medication 3 MILLILITER(S): at 11:26

## 2022-03-14 RX ADMIN — Medication 81 MILLIGRAM(S): at 11:26

## 2022-03-14 RX ADMIN — CLOPIDOGREL BISULFATE 75 MILLIGRAM(S): 75 TABLET, FILM COATED ORAL at 11:27

## 2022-03-14 RX ADMIN — ENOXAPARIN SODIUM 40 MILLIGRAM(S): 100 INJECTION SUBCUTANEOUS at 05:14

## 2022-03-14 RX ADMIN — AMLODIPINE BESYLATE 10 MILLIGRAM(S): 2.5 TABLET ORAL at 05:16

## 2022-03-14 RX ADMIN — Medication 100 MILLIGRAM(S): at 05:17

## 2022-03-14 RX ADMIN — Medication 100 MILLIGRAM(S): at 13:45

## 2022-03-14 RX ADMIN — Medication 3 MILLILITER(S): at 17:52

## 2022-03-14 RX ADMIN — CARVEDILOL PHOSPHATE 25 MILLIGRAM(S): 80 CAPSULE, EXTENDED RELEASE ORAL at 05:16

## 2022-03-14 RX ADMIN — ATORVASTATIN CALCIUM 40 MILLIGRAM(S): 80 TABLET, FILM COATED ORAL at 21:29

## 2022-03-14 RX ADMIN — SPIRONOLACTONE 50 MILLIGRAM(S): 25 TABLET, FILM COATED ORAL at 05:16

## 2022-03-14 RX ADMIN — Medication 100 MILLIGRAM(S): at 21:29

## 2022-03-14 NOTE — PROGRESS NOTE ADULT - ATTENDING COMMENTS
73 yo M w/ Type III TAAA  appreciate cardiac and pulm recs  awaiting PCI records from [place]  awaiting final read for bilateral lower extremity arterial duplex  operative planning

## 2022-03-14 NOTE — PROGRESS NOTE ADULT - ASSESSMENT
Echo < from: TTE with Doppler (w/Cont) (03.11.22 @ 10:19) >  1. Mild-moderate mitral regurgitation.  2. Aortic valvenot well visualized. Grossly, grossly  calcified aortic valve with decreased opening. Peak  transaortic valve gradient equals 21 mm Hg, mean  transaortic valve gradient equals 14 mm Hg, estimated  aortic valve area equals 1.8 sqcm (by continuity equation),  aortic valve velocity time integral equals 35 cm,  consistent with mild aortic stenosis. Moderate, eccentric  aortic regurgitation.  3. Mild left ventricular enlargement.  4. Endocardial visualization enhanced with intravenous  injection of Ultrasonic Enhancing Agent (Definity).  Moderate segmental left ventricular systolic dysfunction.  The mid to basal inferolateral wall and inferoseptal wall  are severely hypokinetic.  5. Normal right ventricular size and function.    < end of copied text >    Assessment and plan     1) Pre op eval : recent PCI , Denies CP or SOB , echo with infero lateral and infseptal hypokinesis , moderate AI , recommend getting cath reports from [facility] , c/w asa plavix coreg     2) AAA: t/t per vascular     3) Splenic lesion : work up per primary team    DVT PPX lovenox

## 2022-03-14 NOTE — PROGRESS NOTE ADULT - ASSESSMENT
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     Plan:  - Duplex of lower extremities done, f/u read eval for pop aneurysm   - will plan for open TAAA repair   - pending cardiology risk stratification and optimization; echo obtained, will obtain records from [place]    - appreciate Pulm consult for perioperative risk assessment, documented  - continue ASA and plavix given recent cardiac stent (RCA stent 2/2022)  - continue home meds  - regular diet    Vascular  [id number]

## 2022-03-14 NOTE — PROGRESS NOTE ADULT - SUBJECTIVE AND OBJECTIVE BOX
Surgery Progress Note    INTERVAl/SUBJECTIVE: No acute event overnight. Patient seen and examined in am rounds.     Vital Signs Last 24 Hrs  T(C): 37.6 (13 Mar 2022 21:28), Max: 37.6 (13 Mar 2022 21:28)  T(F): 99.6 (13 Mar 2022 21:28), Max: 99.6 (13 Mar 2022 21:28)  HR: 90 (13 Mar 2022 21:28) (75 - 97)  BP: 108/67 (13 Mar 2022 21:28) (101/65 - 129/84)  BP(mean): --  RR: 16 (13 Mar 2022 21:28) (16 - 18)  SpO2: 94% (13 Mar 2022 21:28) (93% - 95%)    Physical Exam:  General: NAD, Lying in bed comfortably  Neuro: A+Ox3  HEENT: NC/AT, EOMI  Neck: Soft, supple  Cardio: RRR, nml S1/S2  Resp: Good effort, CTA b/l  Thorax: No chest wall tenderness  Breast: No lesions/masses, no drainage  GI/Abd: Soft, NT/ND, pulsatile mass in epigastric area.    Vascular: All 4 extremities warm, palpable femoral pulses bilaterally, non palpable pop/pedal pulses  Skin: Intact, no breakdown  Lymphatic/Nodes: No palpable lymphadenopathy  Musculoskeletal: All 4 extremities moving spontaneously, no limitations    LABS:                        14.3   5.88  )-----------( 225      ( 13 Mar 2022 06:19 )             42.8     03-13    132<L>  |  97  |  16  ----------------------------<  230<H>  4.3   |  21<L>  |  0.82    Ca    9.8      13 Mar 2022 06:19  Phos  4.2     03-13  Mg     1.9     03-13            INs and OUTs:    03-12-22 @ 06:01  -  03-13-22 @ 07:00  --------------------------------------------------------  IN: 1500 mL / OUT: 0 mL / NET: 1500 mL    03-13-22 @ 07:01  -  03-14-22 @ 00:37  --------------------------------------------------------  IN: 1090 mL / OUT: 0 mL / NET: 1090 mL     Surgery Progress Note    INTERVAl/SUBJECTIVE: No acute event overnight. Patient seen and examined in am rounds. Improving abdominal pain, no other complaints.     Vital Signs Last 24 Hrs  T(C): 36.9 (14 Mar 2022 05:14), Max: 37.6 (13 Mar 2022 21:28)  T(F): 98.4 (14 Mar 2022 05:14), Max: 99.6 (13 Mar 2022 21:28)  HR: 97 (14 Mar 2022 05:14) (85 - 97)  BP: 125/85 (14 Mar 2022 05:14) (100/68 - 129/84)  BP(mean): --  RR: 16 (14 Mar 2022 05:14) (16 - 18)  SpO2: 96% (14 Mar 2022 05:14) (94% - 96%)    I&O's Detail    13 Mar 2022 07:01  -  14 Mar 2022 07:00  --------------------------------------------------------  IN:    Oral Fluid: 1190 mL  Total IN: 1190 mL    OUT:  Total OUT: 0 mL    Total NET: 1190 mL    Physical Exam:  General: NAD, Lying in bed comfortably  Neuro: A+Ox3  HEENT: NC/AT, EOMI  Neck: Soft, supple  Cardio: RRR, nml S1/S2  Resp: Good effort, CTA b/l  Thorax: No chest wall tenderness  Breast: No lesions/masses, no drainage  GI/Abd: Soft, NT/ND, pulsatile mass in epigastric area.    Vascular: All 4 extremities warm, palpable femoral pulses bilaterally, non palpable pop/pedal pulses  Skin: Intact, no breakdown  Lymphatic/Nodes: No palpable lymphadenopathy  Musculoskeletal: All 4 extremities moving spontaneously, no limitations    LABS:                            14.0   7.35  )-----------( 248      ( 14 Mar 2022 07:16 )             41.8   03-14    130<L>  |  95<L>  |  19  ----------------------------<  227<H>  4.2   |  20<L>  |  0.89    Ca    9.6      14 Mar 2022 07:13  Phos  4.2     03-14  Mg     2.0     03-14

## 2022-03-14 NOTE — PROGRESS NOTE ADULT - SUBJECTIVE AND OBJECTIVE BOX
Henry Lopez MD  Interventional Cardiology / Advance Heart Failure and Cardiac Transplant Specialist  [place] Office : [address] [place] [address]  Tel:   [place] Office : [address] [facility] [place] [address]  Tel: [phone]       Pt is lying in bed comfortable not in distress, no chest pains no SOB no palpitations  	  MEDICATIONS:  amLODIPine   Tablet 10 milliGRAM(s) Oral daily  aspirin enteric coated 81 milliGRAM(s) Oral daily  carvedilol 25 milliGRAM(s) Oral every 12 hours  clopidogrel Tablet 75 milliGRAM(s) Oral daily  enoxaparin Injectable 40 milliGRAM(s) SubCutaneous every 24 hours  spironolactone 50 milliGRAM(s) Oral daily      albuterol/ipratropium for Nebulization 3 milliLiter(s) Nebulizer every 6 hours  budesonide 160 MICROgram(s)/formoterol 4.5 MICROgram(s) Inhaler 2 Puff(s) Inhalation two times a day    chlorproMAZINE    Tablet 100 milliGRAM(s) Oral three times a day      atorvastatin 40 milliGRAM(s) Oral at bedtime        PAST MEDICAL/SURGICAL HISTORY  PAST MEDICAL & SURGICAL HISTORY:  CAD (coronary atherosclerotic disease)    HTN (hypertension)    History of percutaneous coronary intervention        SOCIAL HISTORY: Substance Use (street drugs): ( x ) never used  (  ) other:    FAMILY HISTORY:       PHYSICAL EXAM:  T(C): 36.8 (03-14-22 @ 17:00), Max: 37.2 (03-14-22 @ 10:18)  HR: 92 (03-14-22 @ 17:00) (85 - 99)  BP: 126/85 (03-14-22 @ 17:00) (100/68 - 126/85)  RR: 18 (03-14-22 @ 17:00) (16 - 18)  SpO2: 94% (03-14-22 @ 17:00) (94% - 97%)  Wt(kg): --  I&O's Summary    13 Mar 2022 07:01  -  14 Mar 2022 07:00  --------------------------------------------------------  IN: 1190 mL / OUT: 0 mL / NET: 1190 mL    14 Mar 2022 07:01  -  14 Mar 2022 22:36  --------------------------------------------------------  IN: 480 mL / OUT: 0 mL / NET: 480 mL          GENERAL: NAD  EYES:   PERRLA   ENMT:   Moist mucous membranes, Good dentition, No lesions  Cardiovascular: Normal S1 S2, No JVD, No murmurs, No edema  Respiratory: Lungs clear to auscultation	  Gastrointestinal:  Soft, Non-tender, + BS	  Extremities: no edema                                    14.0   7.35  )-----------( 248      ( 14 Mar 2022 07:16 )             41.8     03-14    130<L>  |  95<L>  |  19  ----------------------------<  227<H>  4.2   |  20<L>  |  0.89    Ca    9.6      14 Mar 2022 07:13  Phos  4.2     03-14  Mg     2.0     03-14      proBNP:   Lipid Profile:   HgA1c:   TSH:     Consultant(s) Notes Reviewed:  [x ] YES  [ ] NO    Care Discussed with Consultants/Other Providers [ x] YES  [ ] NO    Imaging Personally Reviewed independently:  [x] YES  [ ] NO    All labs, radiologic studies, vitals, orders and medications list reviewed. Patient is seen and examined at bedside. Case discussed with medical team.

## 2022-03-15 ENCOUNTER — TRANSCRIPTION ENCOUNTER (OUTPATIENT)
Age: 72
End: 2022-03-15

## 2022-03-15 DIAGNOSIS — J44.9 CHRONIC OBSTRUCTIVE PULMONARY DISEASE, UNSPECIFIED: ICD-10-CM

## 2022-03-15 LAB
ANION GAP SERPL CALC-SCNC: 13 MMOL/L — SIGNIFICANT CHANGE UP (ref 5–17)
BUN SERPL-MCNC: 20 MG/DL — SIGNIFICANT CHANGE UP (ref 7–23)
CALCIUM SERPL-MCNC: 9.3 MG/DL — SIGNIFICANT CHANGE UP (ref 8.4–10.5)
CHLORIDE SERPL-SCNC: 94 MMOL/L — LOW (ref 96–108)
CO2 SERPL-SCNC: 22 MMOL/L — SIGNIFICANT CHANGE UP (ref 22–31)
CREAT SERPL-MCNC: 0.98 MG/DL — SIGNIFICANT CHANGE UP (ref 0.5–1.3)
EGFR: 83 ML/MIN/1.73M2 — SIGNIFICANT CHANGE UP
GLUCOSE SERPL-MCNC: 245 MG/DL — HIGH (ref 70–99)
HCT VFR BLD CALC: 41.3 % — SIGNIFICANT CHANGE UP (ref 39–50)
HGB BLD-MCNC: 13.7 G/DL — SIGNIFICANT CHANGE UP (ref 13–17)
MAGNESIUM SERPL-MCNC: 2 MG/DL — SIGNIFICANT CHANGE UP (ref 1.6–2.6)
MCHC RBC-ENTMCNC: 30.9 PG — SIGNIFICANT CHANGE UP (ref 27–34)
MCHC RBC-ENTMCNC: 33.2 GM/DL — SIGNIFICANT CHANGE UP (ref 32–36)
MCV RBC AUTO: 93 FL — SIGNIFICANT CHANGE UP (ref 80–100)
NRBC # BLD: 0 /100 WBCS — SIGNIFICANT CHANGE UP (ref 0–0)
PHOSPHATE SERPL-MCNC: 3.9 MG/DL — SIGNIFICANT CHANGE UP (ref 2.5–4.5)
PLATELET # BLD AUTO: 267 K/UL — SIGNIFICANT CHANGE UP (ref 150–400)
POTASSIUM SERPL-MCNC: 4.2 MMOL/L — SIGNIFICANT CHANGE UP (ref 3.5–5.3)
POTASSIUM SERPL-SCNC: 4.2 MMOL/L — SIGNIFICANT CHANGE UP (ref 3.5–5.3)
RBC # BLD: 4.44 M/UL — SIGNIFICANT CHANGE UP (ref 4.2–5.8)
RBC # FLD: 13.4 % — SIGNIFICANT CHANGE UP (ref 10.3–14.5)
SODIUM SERPL-SCNC: 129 MMOL/L — LOW (ref 135–145)
WBC # BLD: 7.56 K/UL — SIGNIFICANT CHANGE UP (ref 3.8–10.5)
WBC # FLD AUTO: 7.56 K/UL — SIGNIFICANT CHANGE UP (ref 3.8–10.5)

## 2022-03-15 PROCEDURE — 99232 SBSQ HOSP IP/OBS MODERATE 35: CPT

## 2022-03-15 PROCEDURE — 99233 SBSQ HOSP IP/OBS HIGH 50: CPT

## 2022-03-15 RX ORDER — ACETAMINOPHEN 500 MG
650 TABLET ORAL ONCE
Refills: 0 | Status: COMPLETED | OUTPATIENT
Start: 2022-03-15 | End: 2022-03-15

## 2022-03-15 RX ORDER — TIOTROPIUM BROMIDE 18 UG/1
2 CAPSULE ORAL; RESPIRATORY (INHALATION)
Qty: 1 | Refills: 0
Start: 2022-03-15 | End: 2022-04-13

## 2022-03-15 RX ORDER — SPIRONOLACTONE 25 MG/1
1 TABLET, FILM COATED ORAL
Qty: 0 | Refills: 0 | DISCHARGE

## 2022-03-15 RX ORDER — ALBUTEROL 90 UG/1
2 AEROSOL, METERED ORAL
Qty: 1 | Refills: 0
Start: 2022-03-15

## 2022-03-15 RX ORDER — CLOPIDOGREL BISULFATE 75 MG/1
1 TABLET, FILM COATED ORAL
Qty: 0 | Refills: 0 | DISCHARGE

## 2022-03-15 RX ORDER — CARVEDILOL PHOSPHATE 80 MG/1
1 CAPSULE, EXTENDED RELEASE ORAL
Qty: 60 | Refills: 0
Start: 2022-03-15 | End: 2022-04-13

## 2022-03-15 RX ORDER — BUDESONIDE AND FORMOTEROL FUMARATE DIHYDRATE 160; 4.5 UG/1; UG/1
2 AEROSOL RESPIRATORY (INHALATION)
Qty: 1 | Refills: 0
Start: 2022-03-15 | End: 2022-04-13

## 2022-03-15 RX ORDER — CLOPIDOGREL BISULFATE 75 MG/1
1 TABLET, FILM COATED ORAL
Qty: 30 | Refills: 0
Start: 2022-03-15 | End: 2022-04-13

## 2022-03-15 RX ORDER — SPIRONOLACTONE 25 MG/1
1 TABLET, FILM COATED ORAL
Qty: 30 | Refills: 0
Start: 2022-03-15 | End: 2022-04-13

## 2022-03-15 RX ORDER — CARVEDILOL PHOSPHATE 80 MG/1
1 CAPSULE, EXTENDED RELEASE ORAL
Qty: 0 | Refills: 0 | DISCHARGE

## 2022-03-15 RX ADMIN — CLOPIDOGREL BISULFATE 75 MILLIGRAM(S): 75 TABLET, FILM COATED ORAL at 12:07

## 2022-03-15 RX ADMIN — SPIRONOLACTONE 50 MILLIGRAM(S): 25 TABLET, FILM COATED ORAL at 05:22

## 2022-03-15 RX ADMIN — Medication 650 MILLIGRAM(S): at 17:13

## 2022-03-15 RX ADMIN — BUDESONIDE AND FORMOTEROL FUMARATE DIHYDRATE 2 PUFF(S): 160; 4.5 AEROSOL RESPIRATORY (INHALATION) at 18:12

## 2022-03-15 RX ADMIN — Medication 3 MILLILITER(S): at 12:07

## 2022-03-15 RX ADMIN — ENOXAPARIN SODIUM 40 MILLIGRAM(S): 100 INJECTION SUBCUTANEOUS at 05:21

## 2022-03-15 RX ADMIN — BUDESONIDE AND FORMOTEROL FUMARATE DIHYDRATE 2 PUFF(S): 160; 4.5 AEROSOL RESPIRATORY (INHALATION) at 05:21

## 2022-03-15 RX ADMIN — Medication 650 MILLIGRAM(S): at 16:43

## 2022-03-15 RX ADMIN — Medication 100 MILLIGRAM(S): at 15:33

## 2022-03-15 RX ADMIN — Medication 3 MILLILITER(S): at 18:11

## 2022-03-15 RX ADMIN — CARVEDILOL PHOSPHATE 25 MILLIGRAM(S): 80 CAPSULE, EXTENDED RELEASE ORAL at 05:22

## 2022-03-15 RX ADMIN — CARVEDILOL PHOSPHATE 25 MILLIGRAM(S): 80 CAPSULE, EXTENDED RELEASE ORAL at 18:12

## 2022-03-15 RX ADMIN — Medication 3 MILLILITER(S): at 05:20

## 2022-03-15 RX ADMIN — Medication 100 MILLIGRAM(S): at 05:22

## 2022-03-15 RX ADMIN — Medication 3 MILLILITER(S): at 01:03

## 2022-03-15 RX ADMIN — Medication 81 MILLIGRAM(S): at 12:07

## 2022-03-15 RX ADMIN — AMLODIPINE BESYLATE 10 MILLIGRAM(S): 2.5 TABLET ORAL at 05:22

## 2022-03-15 NOTE — PROGRESS NOTE ADULT - SUBJECTIVE AND OBJECTIVE BOX
Surgery Progress Note    INTERVAl/SUBJECTIVE: No acute event overnight. Patient seen and examined in am rounds.     Vital Signs Last 24 Hrs  T(C): 36.4 (15 Mar 2022 01:25), Max: 37.2 (14 Mar 2022 10:18)  T(F): 97.6 (15 Mar 2022 01:25), Max: 98.9 (14 Mar 2022 10:18)  HR: 93 (15 Mar 2022 01:25) (87 - 99)  BP: 109/74 (15 Mar 2022 01:25) (109/74 - 126/85)  BP(mean): --  RR: 16 (15 Mar 2022 01:25) (16 - 18)  SpO2: 94% (15 Mar 2022 01:25) (94% - 97%)    Physical Exam:  General: NAD, Lying in bed comfortably  Neuro: A+Ox3  HEENT: NC/AT, EOMI  Neck: Soft, supple  Cardio: RRR, nml S1/S2  Resp: Good effort, CTA b/l  Thorax: No chest wall tenderness  Breast: No lesions/masses, no drainage  GI/Abd: Soft, NT/ND, pulsatile mass in epigastric area.    Vascular: All 4 extremities warm, palpable femoral pulses bilaterally, non palpable pop/pedal pulses  Skin: Intact, no breakdown  Lymphatic/Nodes: No palpable lymphadenopathy  Musculoskeletal: All 4 extremities moving spontaneously, no limitations      LABS:                        14.0   7.35  )-----------( 248      ( 14 Mar 2022 07:16 )             41.8     03-14    130<L>  |  95<L>  |  19  ----------------------------<  227<H>  4.2   |  20<L>  |  0.89    Ca    9.6      14 Mar 2022 07:13  Phos  4.2     03-14  Mg     2.0     03-14            INs and OUTs:    03-13-22 @ 07:01  -  03-14-22 @ 07:00  --------------------------------------------------------  IN: 1190 mL / OUT: 0 mL / NET: 1190 mL    03-14-22 @ 07:01  -  03-15-22 @ 02:21  --------------------------------------------------------  IN: 600 mL / OUT: 0 mL / NET: 600 mL     Surgery Progress Note    INTERVAl/SUBJECTIVE: No acute event overnight. Patient seen and examined in am rounds. He complains of lower abdomen and left side pain but it has improved from yesterday. He denies any CP, SOB, fever, chills, numbness/tingling in b/l limbs, loss of sensation or motor function, n/v/d      Vital Signs Last 24 Hrs  T(C): 36.4 (15 Mar 2022 01:25), Max: 37.2 (14 Mar 2022 10:18)  T(F): 97.6 (15 Mar 2022 01:25), Max: 98.9 (14 Mar 2022 10:18)  HR: 93 (15 Mar 2022 01:25) (87 - 99)  BP: 109/74 (15 Mar 2022 01:25) (109/74 - 126/85)  BP(mean): --  RR: 16 (15 Mar 2022 01:25) (16 - 18)  SpO2: 94% (15 Mar 2022 01:25) (94% - 97%)    Physical Exam:  General: NAD, Lying in bed comfortably  Neuro: A+Ox3  HEENT: NC/AT, EOMI  Neck: Soft, supple  Cardio: RRR, nml S1/S2  Resp: Good effort, CTA b/l  Thorax: No chest wall tenderness  Breast: No lesions/masses, no drainage  GI/Abd: Soft, NT/ND, pulsatile mass in epigastric area.    Vascular: All 4 extremities warm, palpable femoral pulses bilaterally, non palpable pop/pedal pulses  Skin: Intact, no breakdown  Lymphatic/Nodes: No palpable lymphadenopathy  Musculoskeletal: All 4 extremities moving spontaneously, no limitations      LABS:                        14.0   7.35  )-----------( 248      ( 14 Mar 2022 07:16 )             41.8     03-14    130<L>  |  95<L>  |  19  ----------------------------<  227<H>  4.2   |  20<L>  |  0.89    Ca    9.6      14 Mar 2022 07:13  Phos  4.2     03-14  Mg     2.0     03-14            INs and OUTs:    03-13-22 @ 07:01  -  03-14-22 @ 07:00  --------------------------------------------------------  IN: 1190 mL / OUT: 0 mL / NET: 1190 mL    03-14-22 @ 07:01  -  03-15-22 @ 02:21  --------------------------------------------------------  IN: 600 mL / OUT: 0 mL / NET: 600 mL

## 2022-03-15 NOTE — DISCHARGE NOTE PROVIDER - CARE PROVIDER_API CALL
Sharif Elizabeth)  Surgery  Vascular  [address] [place] [address]  Phone: [phone]  Fax: [phone]  Follow Up Time: 2 weeks    Henry Lopez)  Cardiovascular Disease; Internal Medicine  [address] [place] [address]  Phone: [phone]  Fax: [phone]  Follow Up Time: 1 week   Sharif Elizabeth)  Surgery  Vascular  [address] [place] [address]  Phone: [phone]  Fax: [phone]  Follow Up Time: 2 weeks    Henry Lopez)  Cardiovascular Disease; Internal Medicine  [address] [place] [address]  Phone: [phone]  Fax: [phone]  Follow Up Time: 1 week    Alejandro Barahona)  Critical Care Medicine; Internal Medicine; Pulmonary Disease  [address] [place] [address], [place] [address]  Phone: [phone]  Fax: [phone]  Follow Up Time: 1 week   Sharif Elizabeth)  Surgery  Vascular  [address] [place] [address]  Phone: [phone]  Fax: [phone]  Follow Up Time: 2 weeks    Henry Lopez)  Cardiovascular Disease; Internal Medicine  [address] [place] [address]  Phone: [phone]  Fax: [phone]  Follow Up Time: 1 week    Alejandro Barahona)  Critical Care Medicine; Internal Medicine; Pulmonary Disease  [address] [facility], [address]  [place] [address]  Phone: [phone]  Fax: [phone]  Follow Up Time: 1 week    MELECIO ANTUNEZ  Internal Medicine  [address] [place] [address]  Phone: [phone]  Fax: [phone]  Follow Up Time: 1 week

## 2022-03-15 NOTE — PROGRESS NOTE ADULT - SUBJECTIVE AND OBJECTIVE BOX
Division of Pulmonary, Critical Care, and Sleep Medicine  Contact: weekdays [phone], weekends and after 5PM [phone]    Interval Events: Patient was seen and examined at bedside. Clinically stable. Dyspnea improved with nebulizers/inhalers.    REVIEW OF SYSTEMS:  Constitutional: [ ] fevers [ ] chills [ ] weight loss [ ] weight gain  CV: [ ] chest pain [ ] orthopnea [ ] palpitations [ ] murmur  Resp: [ ] cough [ ] shortness of breath [ ] dyspnea [ ] wheezing [ ] sputum [ ] hemoptysis  [x] All other systems negative  [ ] Unable to assess ROS because ________    OBJECTIVE:  ICU Vital Signs Last 24 Hrs  T(C): 36.6 (15 Mar 2022 08:04), Max: 36.9 (14 Mar 2022 13:16)  T(F): 97.8 (15 Mar 2022 08:04), Max: 98.4 (14 Mar 2022 13:16)  HR: 81 (15 Mar 2022 08:04) (81 - 99)  BP: 95/56 (15 Mar 2022 08:04) (95/56 - 126/85)  RR: 18 (15 Mar 2022 08:04) (16 - 18)  SpO2: 92% (15 Mar 2022 08:04) (92% - 96%)        03-14 @ 07:01  -  03-15 @ 07:00  --------------------------------------------------------  IN: 1020 mL / OUT: 0 mL / NET: 1020 mL      CAPILLARY BLOOD GLUCOSE          PHYSICAL EXAM:  General: NAD, appears comfortable  Skin: Warm and dry, no rashes  Neuro: AAOx3, nonfocal  HEENT: PERRL, EOMI, no oral lesions  Neck: Full range of motion, no JVD  Lymph: No cervical, axillary, or supraclavicular lymphadenopathy  Lungs: CTA b/l, no wheezing  Heart: Regular rate and rhythm, no murmurs  Abdomen: Normoactive bowl sounds, soft, nontender, nondistended  Extremities: No lower extremity edema       HOSPITAL MEDICATIONS:  MEDICATIONS  (STANDING):  albuterol/ipratropium for Nebulization 3 milliLiter(s) Nebulizer every 6 hours  amLODIPine   Tablet 10 milliGRAM(s) Oral daily  aspirin enteric coated 81 milliGRAM(s) Oral daily  atorvastatin 40 milliGRAM(s) Oral at bedtime  budesonide 160 MICROgram(s)/formoterol 4.5 MICROgram(s) Inhaler 2 Puff(s) Inhalation two times a day  carvedilol 25 milliGRAM(s) Oral every 12 hours  chlorproMAZINE    Tablet 100 milliGRAM(s) Oral three times a day  clopidogrel Tablet 75 milliGRAM(s) Oral daily  enoxaparin Injectable 40 milliGRAM(s) SubCutaneous every 24 hours  spironolactone 50 milliGRAM(s) Oral daily    MEDICATIONS  (PRN):      LABS:                        13.7   7.56  )-----------( 267      ( 15 Mar 2022 06:34 )             41.3     Hgb Trend: 13.7<--, 14.0<--, 14.3<--, 13.3<--, 13.7<--  03-15    129<L>  |  94<L>  |  20  ----------------------------<  245<H>  4.2   |  22  |  0.98    Ca    9.3      15 Mar 2022 06:31  Phos  3.9     03-15  Mg     2.0     03-15      Creatinine Trend: 0.98<--, 0.89<--, 0.82<--, 0.77<--, 0.80<--, 0.77<--          MICROBIOLOGY:     RADIOLOGY:  [ ] Reviewed and interpreted by me

## 2022-03-15 NOTE — DISCHARGE NOTE PROVIDER - NSDCMRMEDTOKEN_GEN_ALL_CORE_FT
amLODIPine 10 mg oral tablet: 1 tab(s) orally once a day  aspirin 81 mg oral tablet: 1 tab(s) orally once a day  atorvastatin 40 mg oral tablet: 1 tab(s) orally once a day  carvedilol 25 mg oral tablet: 1 tab(s) orally 2 times a day  chlorproMAZINE 100 mg oral tablet: 1 tab(s) orally 3 times a day  clopidogrel 75 mg oral tablet: 1 tab(s) orally once a day  fludrocortisone 0.1 mg oral tablet: 1 tab(s) orally 2 times a day  spironolactone 50 mg oral tablet: 1 tab(s) orally once a day   albuterol 90 mcg/inh inhalation aerosol: 2 puff(s) inhaled every 6 hours, As Needed   amLODIPine 10 mg oral tablet: 1 tab(s) orally once a day  aspirin 81 mg oral tablet: 1 tab(s) orally once a day  atorvastatin 40 mg oral tablet: 1 tab(s) orally once a day  carvedilol 25 mg oral tablet: 1 tab(s) orally 2 times a day  chlorproMAZINE 100 mg oral tablet: 1 tab(s) orally 3 times a day  clopidogrel 75 mg oral tablet: 1 tab(s) orally once a day  fludrocortisone 0.1 mg oral tablet: 1 tab(s) orally 2 times a day  spironolactone 50 mg oral tablet: 1 tab(s) orally once a day  Symbicort 160 mcg-4.5 mcg/inh inhalation aerosol: 2 puff(s) inhaled 2 times a day   tiotropium 2.5 mcg/inh inhalation aerosol: 2 puff(s) inhaled once a day    albuterol 90 mcg/inh inhalation aerosol: 2 puff(s) inhaled 4 times a day  -for bronchospasm   amLODIPine 10 mg oral tablet: 1 tab(s) orally once a day  aspirin 81 mg oral tablet: 1 tab(s) orally once a day  atorvastatin 40 mg oral tablet: 1 tab(s) orally once a day  carvedilol 25 mg oral tablet: 1 tab(s) orally 2 times a day  chlorproMAZINE 100 mg oral tablet: 1 tab(s) orally 3 times a day  clopidogrel 75 mg oral tablet: 1 tab(s) orally once a day  fludrocortisone 0.1 mg oral tablet: 1 tab(s) orally 2 times a day  GlipiZIDE XL 5 mg oral tablet, extended release: 1 tab(s) orally once a day   metFORMIN 500 mg oral tablet: 1 tab(s) orally once a day   spironolactone 50 mg oral tablet: 1 tab(s) orally once a day  Symbicort 160 mcg-4.5 mcg/inh inhalation aerosol: 2 puff(s) inhaled 2 times a day   tiotropium 2.5 mcg/inh inhalation aerosol: 2 puff(s) inhaled once a day

## 2022-03-15 NOTE — DISCHARGE NOTE PROVIDER - CARE PROVIDERS DIRECT ADDRESSES
,DirectAddress_Unknown,DirectAddress_Unknown ,DirectAddress_Unknown,DirectAddress_Unknown,[email or web address] ,DirectAddress_Unknown,DirectAddress_Unknown,[email or web address],DirectAddress_Unknown

## 2022-03-15 NOTE — PROGRESS NOTE ADULT - REASON FOR ADMISSION
transferred from OSH for suprarenal AAA
(0) indicator not present
High Dose Vitamin A Pregnancy And Lactation Text: High dose vitamin A therapy is contraindicated during pregnancy and breast feeding.

## 2022-03-15 NOTE — DISCHARGE NOTE NURSING/CASE MANAGEMENT/SOCIAL WORK - PATIENT PORTAL LINK FT
You can access the FollowMyHealth Patient Portal offered by [facility] by registering at the following website: [email or web address]. By joining [employer]’s FollowMyHealth portal, you will also be able to view your health information using other applications (apps) compatible with our system.

## 2022-03-15 NOTE — DISCHARGE NOTE PROVIDER - NPI NUMBER (FOR SYSADMIN USE ONLY) :
[[id number]] [[id number]] [[id number]],[[id number]],[[id number]]

## 2022-03-15 NOTE — DISCHARGE NOTE PROVIDER - NSDCFUADDAPPT_GEN_ALL_CORE_FT
You were found to have an ill-defined hypodense area in the medial spleen.  You will require additional imaging as outpatient. Please follow up with your PCP as outpatient.     You were found to have an ill-defined hypodense area in the medial spleen.  You will require additional imaging as outpatient. Please follow up with your PCP Dr. Arciniega as outpatient.

## 2022-03-15 NOTE — CHART NOTE - NSCHARTNOTEFT_GEN_A_CORE
Incidental findings are findings on history, physical examination, lab work, and imaging that are not directly related to the patient's chief complaint and cause for hospital admission.     1. The incidental findings for this patient are (please list): hypodense lesion near spleen  -     2. Were these findings explained to the patient and/or their family (in the event that either the patient requests communication with their family or the patient is unable to understand or remember the findings and plan)? Yes    3. Was a copy of the lab work/ imaging report given to the patient and/or their family? Yes    4. Was the patient asked whether they can follow up with their PMD regarding this finding? If the patient says no, or does not have a PMD, you must identify a provider (i.e. Medicine Clinic or specialist) with whom the patient will follow up. Yes, Dr. Maldonado patient's PCP    5. Was the finding documented on the discharge summary? Yes    [phone] Incidental findings are findings on history, physical examination, lab work, and imaging that are not directly related to the patient's chief complaint and cause for hospital admission.     1. The incidental findings for this patient are (please list): hypodense lesion near spleen  -     2. Were these findings explained to the patient and/or their family (in the event that either the patient requests communication with their family or the patient is unable to understand or remember the findings and plan)? Yes    3. Was a copy of the lab work/ imaging report given to the patient and/or their family? Yes    4. Was the patient asked whether they can follow up with their PMD regarding this finding? If the patient says no, or does not have a PMD, you must identify a provider (i.e. Medicine Clinic or specialist) with whom the patient will follow up. Yes, Dr. Arciniega patient's PCP    5. Was the finding documented on the discharge summary? Yes    [phone]

## 2022-03-15 NOTE — DISCHARGE NOTE PROVIDER - NSDCCPCAREPLAN_GEN_ALL_CORE_FT
PRINCIPAL DISCHARGE DIAGNOSIS  Diagnosis: AAA (abdominal aortic aneurysm)  Assessment and Plan of Treatment: Please follow up with Dr. Elizabeth as outpatient for repair- Call for appointment immediately upon discharge from the hospital.  Call sooner or return to emergency room with worsening back or abdominal pain, lightheadedness, extreme temperature changes to extremeties, pain in extremities, chest pain or worsening shortness of breath      SECONDARY DISCHARGE DIAGNOSES  Diagnosis: CAD (coronary artery disease)  Assessment and Plan of Treatment: Continue aspirin and Plavix- follow up with Dr. Henry Lopez as outpatient- Call for appointment immediately upon discharge from the hospital - Call sooner with worsening chest pain or shortness of breath

## 2022-03-15 NOTE — DISCHARGE NOTE PROVIDER - PROVIDER TOKENS
PROVIDER:[TOKEN:[[id number]],FOLLOWUP:[2 weeks]],PROVIDER:[TOKEN:[[id number]],FOLLOWUP:[1 week]] PROVIDER:[TOKEN:[[id number]],FOLLOWUP:[2 weeks]],PROVIDER:[TOKEN:[[id number]],FOLLOWUP:[1 week]],PROVIDER:[TOKEN:[[id number]],FOLLOWUP:[1 week]] PROVIDER:[TOKEN:[[id number]],FOLLOWUP:[2 weeks]],PROVIDER:[TOKEN:[[id number]],FOLLOWUP:[1 week]],PROVIDER:[TOKEN:[[id number]],FOLLOWUP:[1 week]],PROVIDER:[TOKEN:[[id number]],FOLLOWUP:[1 week]]

## 2022-03-15 NOTE — PROGRESS NOTE ADULT - PROVIDER SPECIALTY LIST ADULT
Vascular Surgery
Cardiology
Vascular Surgery
Pulmonology

## 2022-03-15 NOTE — PROGRESS NOTE ADULT - ASSESSMENT
Echo < from: TTE with Doppler (w/Cont) (03.11.22 @ 10:19) >  1. Mild-moderate mitral regurgitation.  2. Aortic valvenot well visualized. Grossly, grossly  calcified aortic valve with decreased opening. Peak  transaortic valve gradient equals 21 mm Hg, mean  transaortic valve gradient equals 14 mm Hg, estimated  aortic valve area equals 1.8 sqcm (by continuity equation),  aortic valve velocity time integral equals 35 cm,  consistent with mild aortic stenosis. Moderate, eccentric  aortic regurgitation.  3. Mild left ventricular enlargement.  4. Endocardial visualization enhanced with intravenous  injection of Ultrasonic Enhancing Agent (Definity).  Moderate segmental left ventricular systolic dysfunction.  The mid to basal inferolateral wall and inferoseptal wall  are severely hypokinetic.  5. Normal right ventricular size and function.    < end of copied text >    Assessment and plan     1) Pre op eval : recent PCI , Denies CP or SOB , echo with infero lateral and infseptal hypokinesis , moderate AI ,  cath reports from [facility] shows pt had RCA  s/p PCI of RCA with BRYSON , LAD and LCX stents patent  , c/w asa plavix coreg   - pt to get out procedure with vascular     2) AAA: t/t per vascular     3) Splenic lesion : work up per primary team    DVT PPX lovenox

## 2022-03-15 NOTE — DISCHARGE NOTE NURSING/CASE MANAGEMENT/SOCIAL WORK - NSDCFUADDAPPT_GEN_ALL_CORE_FT
You were found to have an ill-defined hypodense area in the medial spleen.  You will require additional imaging as outpatient. Please follow up with your PCP Dr. Arciniega as outpatient.

## 2022-03-15 NOTE — PROGRESS NOTE ADULT - SUBJECTIVE AND OBJECTIVE BOX
Henry Lopez MD  Interventional Cardiology / Advance Heart Failure and Cardiac Transplant Specialist  [place] Office : [address] [place] [address] [place]. [address]  Tel:   [place] Office : [address] [facility] [place] [address]  Tel: [phone]       Pt is lying in bed comfortable not in distress, no chest pains no SOB no palpitations  	  MEDICATIONS:  amLODIPine   Tablet 10 milliGRAM(s) Oral daily  aspirin enteric coated 81 milliGRAM(s) Oral daily  carvedilol 25 milliGRAM(s) Oral every 12 hours  clopidogrel Tablet 75 milliGRAM(s) Oral daily  enoxaparin Injectable 40 milliGRAM(s) SubCutaneous every 24 hours  spironolactone 50 milliGRAM(s) Oral daily      albuterol/ipratropium for Nebulization 3 milliLiter(s) Nebulizer every 6 hours  budesonide 160 MICROgram(s)/formoterol 4.5 MICROgram(s) Inhaler 2 Puff(s) Inhalation two times a day    chlorproMAZINE    Tablet 100 milliGRAM(s) Oral three times a day      atorvastatin 40 milliGRAM(s) Oral at bedtime        PAST MEDICAL/SURGICAL HISTORY  PAST MEDICAL & SURGICAL HISTORY:  CAD (coronary atherosclerotic disease)    HTN (hypertension)    History of percutaneous coronary intervention        SOCIAL HISTORY: Substance Use (street drugs): ( x ) never used  (  ) other:    FAMILY HISTORY:      REVIEW OF SYSTEMS:  CONSTITUTIONAL: No fever, weight loss, or fatigue  EYES: No eye pain, visual disturbances, or discharge  ENMT:  No difficulty hearing, tinnitus, vertigo; No sinus or throat pain  BREASTS: No pain, masses, or nipple discharge  GASTROINTESTINAL: No abdominal or epigastric pain. No nausea, vomiting, or hematemesis; No diarrhea or constipation. No melena or hematochezia.  GENITOURINARY: No dysuria, frequency, hematuria, or incontinence  NEUROLOGICAL: No headaches, memory loss, loss of strength, numbness, or tremors  ENDOCRINE: No heat or cold intolerance; No hair loss  MUSCULOSKELETAL: No joint pain or swelling; No muscle, back, or extremity pain  PSYCHIATRIC: No depression, anxiety, mood swings, or difficulty sleeping  HEME/LYMPH: No easy bruising, or bleeding gums       PHYSICAL EXAM:  T(C): 36.6 (03-15-22 @ 19:20), Max: 36.6 (03-15-22 @ 08:04)  HR: 94 (03-15-22 @ 19:20) (81 - 99)  BP: 137/88 (03-15-22 @ 19:20) (95/56 - 137/88)  RR: 18 (03-15-22 @ 19:20) (16 - 18)  SpO2: 94% (03-15-22 @ 19:20) (92% - 99%)  Wt(kg): --  I&O's Summary    14 Mar 2022 07:01  -  15 Mar 2022 07:00  --------------------------------------------------------  IN: 1020 mL / OUT: 0 mL / NET: 1020 mL          GENERAL: NAD  EYES:   PERRLA   ENMT:   Moist mucous membranes, Good dentition, No lesions  Cardiovascular: Normal S1 S2, No JVD, No murmurs, No edema  Respiratory: Lungs clear to auscultation	  Gastrointestinal:  Soft, Non-tender, + BS	  Extremities: no edema                                    13.7   7.56  )-----------( 267      ( 15 Mar 2022 06:34 )             41.3     03-15    129<L>  |  94<L>  |  20  ----------------------------<  245<H>  4.2   |  22  |  0.98    Ca    9.3      15 Mar 2022 06:31  Phos  3.9     03-15  Mg     2.0     03-15      proBNP:   Lipid Profile:   HgA1c:   TSH:     Consultant(s) Notes Reviewed:  [x ] YES  [ ] NO    Care Discussed with Consultants/Other Providers [ x] YES  [ ] NO    Imaging Personally Reviewed independently:  [x] YES  [ ] NO    All labs, radiologic studies, vitals, orders and medications list reviewed. Patient is seen and examined at bedside. Case discussed with medical team.

## 2022-03-15 NOTE — PROGRESS NOTE ADULT - ATTENDING COMMENTS
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

## 2022-03-15 NOTE — PROGRESS NOTE ADULT - ASSESSMENT
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     Plan:  - Duplex of lower extremities done, f/u read eval for pop aneurysm   - will plan for open TAAA repair   - pending cardiology risk stratification and optimization; echo obtained, will obtain records from [place]    - appreciate Pulm consult for perioperative risk assessment, documented  - continue ASA and plavix given recent cardiac stent (RCA stent 2/2022)  - continue home meds  - regular diet    Vascular  [id number] 73yo M ho CAD s/p stent 2/2022 transferred from OSH for newly found asymptomatic Type III TAAA. Pt's medical records from [place] were sent over in regards to his cardiac stent placement. He has a BRYSON and will not be able to under go a major surgery at this time.    Plan:   - No plan for open TAAA repair on this admission due to BRYSON  - Will f/u with cardiology recommendations for discharge  - Will f/u with Pulm recommendations for discharge   - continue ASA and plavix given recent cardiac stent (RCA stent 2/2022)  - continue home meds  - regular diet  -Pt can be discharged home and will followup outpatient with vascular surgery in 1 month to discuss further surgical options    Vascular  [id number]

## 2022-03-15 NOTE — PROGRESS NOTE ADULT - ASSESSMENT
72 year old male, current smoker, >50 pack year history with combined pulmonary fibrosis and emphysema, AAA initially planned for repair, now on hold in setting of BRYSON.   - Patient reports symptomatic relief with inhalers.  - As surgery is now on hold, recommend changing duonebs to prn  - Continue Symbicort BID, add Spiriva once daily  - Can be discharged on Symbicort and Spiriva   - Need outpatient PFTs  - Follow up as outpatient at [phone] if able, but will need pulmonology follow up closer to home if he is unable to travel as he lives in [facility].       72 year old male, current smoker, >50 pack year history with combined pulmonary fibrosis and emphysema, AAA initially planned for repair, now on hold in setting of BRYSON.   - Patient reports symptomatic relief with inhalers.  - As surgery is now on hold, recommend changing duonebs to prn  - Continue Symbicort BID, add Spiriva once daily  - Can be discharged on Symbicort and Spiriva   - Need outpatient PFTs  - He does not wish to stop smoking at this time.  - Follow up as outpatient at [phone] if able, but will need pulmonology follow up closer to home if he is unable to travel as he lives in [facility].

## 2022-03-15 NOTE — DISCHARGE NOTE NURSING/CASE MANAGEMENT/SOCIAL WORK - NSDCPEFALRISK_GEN_ALL_CORE
For information on Fall & Injury Prevention, visit: [email or web address] OR  [email or web address] OR  https://www.cdc.gov/steadi/patient.html

## 2022-03-15 NOTE — DISCHARGE NOTE PROVIDER - HOSPITAL COURSE
71yo male with PMH of CAD s/p stent 2/2022, HTN. Patient presented to ER at [facility] with SOB and elevated heart rate. CT was done at OSH  to r/o PE, which showed a 6.7cm suprarenal abdominal aorta aneurysm, no PE. Patient was then transferred to [facility] for further care overnight. Patient has no history of AAA and is not aware of this before.  Patient was admitted to vascular for medical optimization for surgical repair.  Patient was continued ASA and Plavix given recent cardiac stent (RCA stent 2/2022).  Given the recent stenting, there was inability to stop Plavix and aspirin, therefore repair of AAA was placed on hold. Patient will follow up as outpatient for repair.  On day of discharge, the patient was tolerating diet, ambulating well and pain controlled. He will follow up with Dr. Elizabeth in 1 week. 73yo male with PMH of CAD s/p stent 2/2022, HTN. Patient presented to ER at [facility] with SOB and elevated heart rate. CT was done at OSH  to r/o PE, which showed a 6.7cm suprarenal abdominal aorta aneurysm, no PE. Patient was then transferred to [facility] for further care overnight. Patient has no history of AAA and is not aware of this before.  Patient was admitted to vascular for medical optimization for surgical repair.  Patient was continued ASA and Plavix given recent cardiac stent (RCA stent 2/2022).  Given the recent stenting, there was inability to stop Plavix and aspirin, therefore repair of AAA was placed on hold. The pt became hyponatremic in which she was repleted and continued to be monitored. The pt will follow up as outpatient for repair.  On day of discharge, the patient was tolerating diet, ambulating well and pain controlled. He will follow up with Dr. Elizabeth in 1 week.

## 2022-03-16 VITALS
RESPIRATION RATE: 18 BRPM | TEMPERATURE: 99 F | SYSTOLIC BLOOD PRESSURE: 150 MMHG | DIASTOLIC BLOOD PRESSURE: 71 MMHG | OXYGEN SATURATION: 96 % | HEART RATE: 67 BPM

## 2022-03-16 LAB — SARS-COV-2 RNA SPEC QL NAA+PROBE: SIGNIFICANT CHANGE UP

## 2022-03-16 PROCEDURE — 85610 PROTHROMBIN TIME: CPT

## 2022-03-16 PROCEDURE — 93005 ELECTROCARDIOGRAM TRACING: CPT

## 2022-03-16 PROCEDURE — 94640 AIRWAY INHALATION TREATMENT: CPT

## 2022-03-16 PROCEDURE — 86850 RBC ANTIBODY SCREEN: CPT

## 2022-03-16 PROCEDURE — 36415 COLL VENOUS BLD VENIPUNCTURE: CPT

## 2022-03-16 PROCEDURE — 80053 COMPREHEN METABOLIC PANEL: CPT

## 2022-03-16 PROCEDURE — 86803 HEPATITIS C AB TEST: CPT

## 2022-03-16 PROCEDURE — 97161 PT EVAL LOW COMPLEX 20 MIN: CPT

## 2022-03-16 PROCEDURE — 86901 BLOOD TYPING SEROLOGIC RH(D): CPT

## 2022-03-16 PROCEDURE — 80048 BASIC METABOLIC PNL TOTAL CA: CPT

## 2022-03-16 PROCEDURE — U0003: CPT

## 2022-03-16 PROCEDURE — 85027 COMPLETE CBC AUTOMATED: CPT

## 2022-03-16 PROCEDURE — 71275 CT ANGIOGRAPHY CHEST: CPT

## 2022-03-16 PROCEDURE — 93925 LOWER EXTREMITY STUDY: CPT

## 2022-03-16 PROCEDURE — 83735 ASSAY OF MAGNESIUM: CPT

## 2022-03-16 PROCEDURE — 84100 ASSAY OF PHOSPHORUS: CPT

## 2022-03-16 PROCEDURE — U0005: CPT

## 2022-03-16 PROCEDURE — 85730 THROMBOPLASTIN TIME PARTIAL: CPT

## 2022-03-16 PROCEDURE — 86900 BLOOD TYPING SEROLOGIC ABO: CPT

## 2022-03-16 PROCEDURE — 74174 CTA ABD&PLVS W/CONTRAST: CPT

## 2022-03-16 PROCEDURE — C8929: CPT

## 2022-03-16 RX ORDER — ACETAMINOPHEN 500 MG
1000 TABLET ORAL ONCE
Refills: 0 | Status: COMPLETED | OUTPATIENT
Start: 2022-03-16 | End: 2022-03-16

## 2022-03-16 RX ORDER — FLUDROCORTISONE ACETATE 0.1 MG/1
1 TABLET ORAL
Qty: 60 | Refills: 0
Start: 2022-03-16 | End: 2022-04-14

## 2022-03-16 RX ORDER — AMLODIPINE BESYLATE 2.5 MG/1
10 TABLET ORAL DAILY
Refills: 0 | Status: DISCONTINUED | OUTPATIENT
Start: 2022-03-16 | End: 2022-03-16

## 2022-03-16 RX ORDER — ALBUTEROL 90 UG/1
2 AEROSOL, METERED ORAL
Qty: 240 | Refills: 0
Start: 2022-03-16 | End: 2022-04-14

## 2022-03-16 RX ORDER — ALBUTEROL 90 UG/1
2 AEROSOL, METERED ORAL
Qty: 1 | Refills: 0
Start: 2022-03-16

## 2022-03-16 RX ORDER — METFORMIN HYDROCHLORIDE 850 MG/1
1 TABLET ORAL
Qty: 30 | Refills: 0
Start: 2022-03-16 | End: 2022-04-14

## 2022-03-16 RX ORDER — SPIRONOLACTONE 25 MG/1
50 TABLET, FILM COATED ORAL DAILY
Refills: 0 | Status: DISCONTINUED | OUTPATIENT
Start: 2022-03-16 | End: 2022-03-16

## 2022-03-16 RX ORDER — CARVEDILOL PHOSPHATE 80 MG/1
25 CAPSULE, EXTENDED RELEASE ORAL EVERY 12 HOURS
Refills: 0 | Status: DISCONTINUED | OUTPATIENT
Start: 2022-03-16 | End: 2022-03-16

## 2022-03-16 RX ORDER — FLUDROCORTISONE ACETATE 0.1 MG/1
1 TABLET ORAL
Qty: 0 | Refills: 0 | DISCHARGE

## 2022-03-16 RX ADMIN — CLOPIDOGREL BISULFATE 75 MILLIGRAM(S): 75 TABLET, FILM COATED ORAL at 12:49

## 2022-03-16 RX ADMIN — Medication 3 MILLILITER(S): at 05:25

## 2022-03-16 RX ADMIN — Medication 3 MILLILITER(S): at 00:13

## 2022-03-16 RX ADMIN — Medication 1000 MILLIGRAM(S): at 03:48

## 2022-03-16 RX ADMIN — Medication 3 MILLILITER(S): at 12:49

## 2022-03-16 RX ADMIN — BUDESONIDE AND FORMOTEROL FUMARATE DIHYDRATE 2 PUFF(S): 160; 4.5 AEROSOL RESPIRATORY (INHALATION) at 05:26

## 2022-03-16 RX ADMIN — ENOXAPARIN SODIUM 40 MILLIGRAM(S): 100 INJECTION SUBCUTANEOUS at 05:32

## 2022-03-16 RX ADMIN — Medication 81 MILLIGRAM(S): at 12:49

## 2022-03-16 RX ADMIN — Medication 100 MILLIGRAM(S): at 05:32

## 2022-03-16 RX ADMIN — Medication 1000 MILLIGRAM(S): at 04:18

## 2022-03-23 PROBLEM — I25.10 ATHEROSCLEROTIC HEART DISEASE OF NATIVE CORONARY ARTERY WITHOUT ANGINA PECTORIS: Chronic | Status: ACTIVE | Noted: 2022-03-10

## 2022-03-23 PROBLEM — I10 ESSENTIAL (PRIMARY) HYPERTENSION: Chronic | Status: ACTIVE | Noted: 2022-03-10

## 2022-03-23 PROBLEM — Z00.00 ENCOUNTER FOR PREVENTIVE HEALTH EXAMINATION: Status: ACTIVE | Noted: 2022-03-23

## 2022-04-27 ENCOUNTER — APPOINTMENT (OUTPATIENT)
Dept: VASCULAR SURGERY | Facility: CLINIC | Age: 72
End: 2022-04-27